# Patient Record
Sex: MALE | Race: WHITE | NOT HISPANIC OR LATINO | Employment: OTHER | URBAN - METROPOLITAN AREA
[De-identification: names, ages, dates, MRNs, and addresses within clinical notes are randomized per-mention and may not be internally consistent; named-entity substitution may affect disease eponyms.]

---

## 2017-11-29 ENCOUNTER — HOSPITAL ENCOUNTER (EMERGENCY)
Facility: HOSPITAL | Age: 62
Discharge: HOME | End: 2017-11-29
Attending: FAMILY MEDICINE | Admitting: FAMILY MEDICINE

## 2017-11-29 ENCOUNTER — APPOINTMENT (OUTPATIENT)
Dept: GENERAL RADIOLOGY | Facility: HOSPITAL | Age: 62
End: 2017-11-29

## 2017-11-29 VITALS
HEIGHT: 69 IN | BODY MASS INDEX: 27.25 KG/M2 | RESPIRATION RATE: 18 BRPM | DIASTOLIC BLOOD PRESSURE: 106 MMHG | WEIGHT: 184 LBS | TEMPERATURE: 99.9 F | SYSTOLIC BLOOD PRESSURE: 181 MMHG | OXYGEN SATURATION: 94 % | HEART RATE: 99 BPM

## 2017-11-29 DIAGNOSIS — J18.9 PNEUMONIA OF LEFT LOWER LOBE DUE TO INFECTIOUS ORGANISM: Primary | ICD-10-CM

## 2017-11-29 DIAGNOSIS — J44.0 CHRONIC OBSTRUCTIVE PULMONARY DISEASE WITH ACUTE LOWER RESPIRATORY INFECTION (HCC): ICD-10-CM

## 2017-11-29 LAB
ALBUMIN SERPL-MCNC: 3.4 G/DL (ref 3.5–5.2)
ALBUMIN/GLOB SERPL: 0.9 G/DL
ALP SERPL-CCNC: 105 U/L (ref 39–117)
ALT SERPL W P-5'-P-CCNC: 31 U/L (ref 1–41)
ANION GAP SERPL CALCULATED.3IONS-SCNC: 13.3 MMOL/L
AST SERPL-CCNC: 37 U/L (ref 1–40)
B PERT DNA SPEC QL NAA+PROBE: NOT DETECTED
BASOPHILS # BLD AUTO: 0.04 10*3/MM3 (ref 0–0.2)
BASOPHILS NFR BLD AUTO: 0.3 % (ref 0–1.5)
BILIRUB SERPL-MCNC: 0.7 MG/DL (ref 0.1–1.2)
BUN BLD-MCNC: 18 MG/DL (ref 8–23)
BUN/CREAT SERPL: 20.7 (ref 7–25)
C PNEUM DNA NPH QL NAA+NON-PROBE: NOT DETECTED
CALCIUM SPEC-SCNC: 9.8 MG/DL (ref 8.6–10.5)
CHLORIDE SERPL-SCNC: 103 MMOL/L (ref 98–107)
CO2 SERPL-SCNC: 23.7 MMOL/L (ref 22–29)
CREAT BLD-MCNC: 0.87 MG/DL (ref 0.76–1.27)
DEPRECATED RDW RBC AUTO: 48 FL (ref 37–54)
EOSINOPHIL # BLD AUTO: 0.02 10*3/MM3 (ref 0–0.7)
EOSINOPHIL NFR BLD AUTO: 0.1 % (ref 0.3–6.2)
ERYTHROCYTE [DISTWIDTH] IN BLOOD BY AUTOMATED COUNT: 12.9 % (ref 11.5–14.5)
FLUAV H1 2009 PAND RNA NPH QL NAA+PROBE: NOT DETECTED
FLUAV H1 HA GENE NPH QL NAA+PROBE: NOT DETECTED
FLUAV H3 RNA NPH QL NAA+PROBE: NOT DETECTED
FLUAV SUBTYP SPEC NAA+PROBE: NOT DETECTED
FLUBV RNA ISLT QL NAA+PROBE: NOT DETECTED
GFR SERPL CREATININE-BSD FRML MDRD: 89 ML/MIN/1.73
GLOBULIN UR ELPH-MCNC: 3.9 GM/DL
GLUCOSE BLD-MCNC: 125 MG/DL (ref 65–99)
HADV DNA SPEC NAA+PROBE: NOT DETECTED
HCOV 229E RNA SPEC QL NAA+PROBE: NOT DETECTED
HCOV HKU1 RNA SPEC QL NAA+PROBE: NOT DETECTED
HCOV NL63 RNA SPEC QL NAA+PROBE: NOT DETECTED
HCOV OC43 RNA SPEC QL NAA+PROBE: NOT DETECTED
HCT VFR BLD AUTO: 34.2 % (ref 40.4–52.2)
HGB BLD-MCNC: 12.2 G/DL (ref 13.7–17.6)
HMPV RNA NPH QL NAA+NON-PROBE: NOT DETECTED
HOLD SPECIMEN: NORMAL
HOLD SPECIMEN: NORMAL
HPIV1 RNA SPEC QL NAA+PROBE: NOT DETECTED
HPIV2 RNA SPEC QL NAA+PROBE: NOT DETECTED
HPIV3 RNA NPH QL NAA+PROBE: NOT DETECTED
HPIV4 P GENE NPH QL NAA+PROBE: NOT DETECTED
IMM GRANULOCYTES # BLD: 0.13 10*3/MM3 (ref 0–0.03)
IMM GRANULOCYTES NFR BLD: 0.9 % (ref 0–0.5)
LYMPHOCYTES # BLD AUTO: 1.07 10*3/MM3 (ref 0.9–4.8)
LYMPHOCYTES NFR BLD AUTO: 7.2 % (ref 19.6–45.3)
M PNEUMO IGG SER IA-ACNC: NOT DETECTED
MCH RBC QN AUTO: 36.4 PG (ref 27–32.7)
MCHC RBC AUTO-ENTMCNC: 35.7 G/DL (ref 32.6–36.4)
MCV RBC AUTO: 102.1 FL (ref 79.8–96.2)
MONOCYTES # BLD AUTO: 1.78 10*3/MM3 (ref 0.2–1.2)
MONOCYTES NFR BLD AUTO: 11.9 % (ref 5–12)
NEUTROPHILS # BLD AUTO: 11.89 10*3/MM3 (ref 1.9–8.1)
NEUTROPHILS NFR BLD AUTO: 79.6 % (ref 42.7–76)
NT-PROBNP SERPL-MCNC: 212 PG/ML (ref 5–900)
PLATELET # BLD AUTO: 355 10*3/MM3 (ref 140–500)
PMV BLD AUTO: 9.4 FL (ref 6–12)
POTASSIUM BLD-SCNC: 4.1 MMOL/L (ref 3.5–5.2)
PROT SERPL-MCNC: 7.3 G/DL (ref 6–8.5)
RBC # BLD AUTO: 3.35 10*6/MM3 (ref 4.6–6)
RHINOVIRUS RNA SPEC NAA+PROBE: NOT DETECTED
RSV RNA NPH QL NAA+NON-PROBE: NOT DETECTED
SODIUM BLD-SCNC: 140 MMOL/L (ref 136–145)
TROPONIN T SERPL-MCNC: <0.01 NG/ML (ref 0–0.03)
WBC NRBC COR # BLD: 14.93 10*3/MM3 (ref 4.5–10.7)
WHOLE BLOOD HOLD SPECIMEN: NORMAL
WHOLE BLOOD HOLD SPECIMEN: NORMAL

## 2017-11-29 PROCEDURE — 99284 EMERGENCY DEPT VISIT MOD MDM: CPT

## 2017-11-29 PROCEDURE — 87040 BLOOD CULTURE FOR BACTERIA: CPT | Performed by: FAMILY MEDICINE

## 2017-11-29 PROCEDURE — 84484 ASSAY OF TROPONIN QUANT: CPT | Performed by: FAMILY MEDICINE

## 2017-11-29 PROCEDURE — 87798 DETECT AGENT NOS DNA AMP: CPT | Performed by: FAMILY MEDICINE

## 2017-11-29 PROCEDURE — 71020 HC CHEST PA AND LATERAL: CPT

## 2017-11-29 PROCEDURE — 25010000002 CEFTRIAXONE PER 250 MG: Performed by: FAMILY MEDICINE

## 2017-11-29 PROCEDURE — 36415 COLL VENOUS BLD VENIPUNCTURE: CPT | Performed by: FAMILY MEDICINE

## 2017-11-29 PROCEDURE — 83880 ASSAY OF NATRIURETIC PEPTIDE: CPT | Performed by: FAMILY MEDICINE

## 2017-11-29 PROCEDURE — 93005 ELECTROCARDIOGRAM TRACING: CPT | Performed by: FAMILY MEDICINE

## 2017-11-29 PROCEDURE — 93010 ELECTROCARDIOGRAM REPORT: CPT | Performed by: INTERNAL MEDICINE

## 2017-11-29 PROCEDURE — 94799 UNLISTED PULMONARY SVC/PX: CPT

## 2017-11-29 PROCEDURE — 96365 THER/PROPH/DIAG IV INF INIT: CPT

## 2017-11-29 PROCEDURE — 94640 AIRWAY INHALATION TREATMENT: CPT

## 2017-11-29 PROCEDURE — 85025 COMPLETE CBC W/AUTO DIFF WBC: CPT | Performed by: FAMILY MEDICINE

## 2017-11-29 PROCEDURE — 87486 CHLMYD PNEUM DNA AMP PROBE: CPT | Performed by: FAMILY MEDICINE

## 2017-11-29 PROCEDURE — 87633 RESP VIRUS 12-25 TARGETS: CPT | Performed by: FAMILY MEDICINE

## 2017-11-29 PROCEDURE — 87581 M.PNEUMON DNA AMP PROBE: CPT | Performed by: FAMILY MEDICINE

## 2017-11-29 PROCEDURE — 80053 COMPREHEN METABOLIC PANEL: CPT | Performed by: FAMILY MEDICINE

## 2017-11-29 RX ORDER — SODIUM CHLORIDE 0.9 % (FLUSH) 0.9 %
10 SYRINGE (ML) INJECTION AS NEEDED
Status: DISCONTINUED | OUTPATIENT
Start: 2017-11-29 | End: 2017-11-29 | Stop reason: HOSPADM

## 2017-11-29 RX ORDER — LISINOPRIL AND HYDROCHLOROTHIAZIDE 12.5; 1 MG/1; MG/1
1 TABLET ORAL DAILY
COMMUNITY
End: 2018-01-23 | Stop reason: SDUPTHER

## 2017-11-29 RX ORDER — LEVOFLOXACIN 750 MG/1
750 TABLET ORAL DAILY
Qty: 5 TABLET | Refills: 0 | Status: SHIPPED | OUTPATIENT
Start: 2017-11-29 | End: 2017-12-28

## 2017-11-29 RX ORDER — ACETAMINOPHEN 325 MG/1
975 TABLET ORAL ONCE
Status: COMPLETED | OUTPATIENT
Start: 2017-11-29 | End: 2017-11-29

## 2017-11-29 RX ORDER — LEVOFLOXACIN 750 MG/1
750 TABLET ORAL ONCE
Status: COMPLETED | OUTPATIENT
Start: 2017-11-29 | End: 2017-11-29

## 2017-11-29 RX ORDER — CEFTRIAXONE SODIUM 1 G/50ML
1 INJECTION, SOLUTION INTRAVENOUS ONCE
Status: COMPLETED | OUTPATIENT
Start: 2017-11-29 | End: 2017-11-29

## 2017-11-29 RX ORDER — IPRATROPIUM BROMIDE AND ALBUTEROL SULFATE 2.5; .5 MG/3ML; MG/3ML
3 SOLUTION RESPIRATORY (INHALATION) ONCE
Status: COMPLETED | OUTPATIENT
Start: 2017-11-29 | End: 2017-11-29

## 2017-11-29 RX ADMIN — LEVOFLOXACIN 750 MG: 750 TABLET, FILM COATED ORAL at 17:57

## 2017-11-29 RX ADMIN — IPRATROPIUM BROMIDE AND ALBUTEROL SULFATE 3 ML: .5; 3 SOLUTION RESPIRATORY (INHALATION) at 16:20

## 2017-11-29 RX ADMIN — ACETAMINOPHEN 975 MG: 325 TABLET ORAL at 17:57

## 2017-11-29 RX ADMIN — CEFTRIAXONE SODIUM 1 G: 1 INJECTION, SOLUTION INTRAVENOUS at 16:51

## 2017-12-04 LAB
BACTERIA SPEC AEROBE CULT: NORMAL
BACTERIA SPEC AEROBE CULT: NORMAL

## 2017-12-28 ENCOUNTER — OFFICE VISIT (OUTPATIENT)
Dept: FAMILY MEDICINE CLINIC | Facility: CLINIC | Age: 62
End: 2017-12-28

## 2017-12-28 VITALS
DIASTOLIC BLOOD PRESSURE: 98 MMHG | TEMPERATURE: 99.7 F | WEIGHT: 186.2 LBS | HEIGHT: 69 IN | SYSTOLIC BLOOD PRESSURE: 164 MMHG | HEART RATE: 93 BPM | OXYGEN SATURATION: 98 % | BODY MASS INDEX: 27.58 KG/M2

## 2017-12-28 DIAGNOSIS — Z23 NEED FOR TDAP VACCINATION: ICD-10-CM

## 2017-12-28 DIAGNOSIS — J06.9 VIRAL UPPER RESPIRATORY TRACT INFECTION: ICD-10-CM

## 2017-12-28 DIAGNOSIS — Z23 NEED FOR PNEUMOCOCCAL VACCINATION: ICD-10-CM

## 2017-12-28 DIAGNOSIS — J45.41 MODERATE PERSISTENT ASTHMA WITH ACUTE EXACERBATION: ICD-10-CM

## 2017-12-28 DIAGNOSIS — R05.9 COUGH: Primary | ICD-10-CM

## 2017-12-28 DIAGNOSIS — Z23 NEED FOR INFLUENZA VACCINATION: ICD-10-CM

## 2017-12-28 PROCEDURE — G0009 ADMIN PNEUMOCOCCAL VACCINE: HCPCS | Performed by: FAMILY MEDICINE

## 2017-12-28 PROCEDURE — 90471 IMMUNIZATION ADMIN: CPT | Performed by: FAMILY MEDICINE

## 2017-12-28 PROCEDURE — 99203 OFFICE O/P NEW LOW 30 MIN: CPT | Performed by: FAMILY MEDICINE

## 2017-12-28 PROCEDURE — 90732 PPSV23 VACC 2 YRS+ SUBQ/IM: CPT | Performed by: FAMILY MEDICINE

## 2017-12-28 PROCEDURE — 90715 TDAP VACCINE 7 YRS/> IM: CPT | Performed by: FAMILY MEDICINE

## 2017-12-28 RX ORDER — MONTELUKAST SODIUM 10 MG/1
10 TABLET ORAL NIGHTLY
Qty: 30 TABLET | Refills: 3 | Status: SHIPPED | OUTPATIENT
Start: 2017-12-28

## 2017-12-28 RX ORDER — GUAIFENESIN 600 MG/1
1200 TABLET, EXTENDED RELEASE ORAL EVERY 12 HOURS SCHEDULED
Qty: 30 TABLET | Refills: 1 | Status: SHIPPED | OUTPATIENT
Start: 2017-12-28 | End: 2018-05-24 | Stop reason: HOSPADM

## 2017-12-28 RX ORDER — FLUTICASONE PROPIONATE 50 MCG
2 SPRAY, SUSPENSION (ML) NASAL DAILY
Qty: 1 BOTTLE | Refills: 6 | Status: SHIPPED | OUTPATIENT
Start: 2017-12-28 | End: 2018-01-27

## 2017-12-28 NOTE — PROGRESS NOTES
Subjective   Feliciano Llamas is a 62 y.o. male.     Chief Complaint   Patient presents with   • Hypertension     follow up    • Establish Care     new pt establishing today in office   • Cough     pt is been with dry cough and very sob since 5 days ago worse at night        HPI     Patient is a pleasant 62-year-old male here to establish care.  He has a past medical history of asthma/COPD-the patient is uncertain, however he states he has a diagnosis of asthma since childhood.  And hypertension controlled on hydrochlorothiazide.  He has a long history of mental illness in his family.  Went to the ED 11/29/2017 treated for CAP PNA - treated with levaquin, commpleted course of atibioitic with a residual dry cough, some mild SOA, feels that the combivent is helping.  He states that he is allergic to prednisone, he gets very anxious and even manic-he states he was arrested at one time after having steroids, and then, at another time required intubation?  However he takes oral inhaled steroids with no issue..  This seems to be more of an adverse effect and allergic reaction.    The following portions of the patient's history were reviewed:  [x] Allergies   [x] Current Medications  [x] Past Family History   [x] Past Medical History  [x] Past Social History   [x] Past Surgical History  [x] Problem List    Review of Systems   Constitutional: Negative.    Eyes: Negative.    Respiratory: Positive for cough, shortness of breath and wheezing.    Gastrointestinal: Negative.    Endocrine: Negative.    Genitourinary: Negative.    Musculoskeletal: Positive for arthralgias and myalgias.   Skin: Negative.    Allergic/Immunologic: Positive for environmental allergies.   Neurological: Positive for headaches.   Hematological: Negative.    Psychiatric/Behavioral: Negative.        Objective  Vitals:    12/28/17 1026   BP: 164/98   Pulse: 93   Temp: 99.7 °F (37.6 °C)   SpO2: 98%        Physical Exam   Constitutional: He is oriented to  person, place, and time. He appears well-developed and well-nourished. No distress.   HENT:   Head: Normocephalic.   Right Ear: External ear normal.   Left Ear: External ear normal.   Nose: Nose normal.   No sinus tenderness    Eyes: EOM are normal.   Cardiovascular: Normal rate, regular rhythm, normal heart sounds and intact distal pulses.    No murmur heard.  Pulmonary/Chest: Effort normal and breath sounds normal. No respiratory distress.   No wheezing or crackles.    Musculoskeletal: Normal range of motion.   Neurological: He is alert and oriented to person, place, and time.   Skin: Skin is warm and dry. No rash noted.   Psychiatric: He has a normal mood and affect. His behavior is normal. Judgment and thought content normal.   Nursing note and vitals reviewed.        Current Outpatient Prescriptions:   •  fluticasone-salmeterol (ADVAIR) 500-50 MCG/DOSE DISKUS, Inhale 1 puff 2 (Two) Times a Day., Disp: 60 each, Rfl: 0  •  ipratropium-albuterol (COMBIVENT RESPIMAT)  MCG/ACT inhaler, Inhale 1 puff 4 (Four) Times a Day As Needed for Wheezing., Disp: 1 g, Rfl: 0  •  lisinopril-hydrochlorothiazide (PRINZIDE,ZESTORETIC) 10-12.5 MG per tablet, Take 1 tablet by mouth Daily., Disp: , Rfl:   •  fluticasone (FLONASE) 50 MCG/ACT nasal spray, 2 sprays into each nostril Daily for 30 days. Administer 2 sprays in each nostril daily., Disp: 1 bottle, Rfl: 6  •  guaiFENesin (MUCINEX) 600 MG 12 hr tablet, Take 2 tablets by mouth Every 12 (Twelve) Hours., Disp: 30 tablet, Rfl: 1  •  montelukast (SINGULAIR) 10 MG tablet, Take 1 tablet by mouth Every Night., Disp: 30 tablet, Rfl: 3    Procedures    Lab Results (most recent)     None          Assessment/Plan   Feliciano was seen today for hypertension, establish care and cough.    Diagnoses and all orders for this visit:    Cough  -     Cancel: XR Chest 2 View  -     Full Pulmonary Function Test With Bronchodilator; Future    Moderate persistent asthma with acute exacerbation  -      Full Pulmonary Function Test With Bronchodilator; Future    Viral upper respiratory tract infection  -     montelukast (SINGULAIR) 10 MG tablet; Take 1 tablet by mouth Every Night.  -     fluticasone (FLONASE) 50 MCG/ACT nasal spray; 2 sprays into each nostril Daily for 30 days. Administer 2 sprays in each nostril daily.  -     guaiFENesin (MUCINEX) 600 MG 12 hr tablet; Take 2 tablets by mouth Every 12 (Twelve) Hours.    Need for Tdap vaccination  -     Tdap Vaccine Greater Than or Equal To 6yo IM    Need for pneumococcal vaccination  -     Pneumococcal Polysaccharide Vaccine 23-Valent Greater Than or Equal To 3yo Subcutaneous / IM    Need for influenza vaccination  -     Cancel: Flu Vaccine Quad PF 3YR+    Flu unavailable - go to pharmacy for flu and shingles shot.  Normal pulmonary exam, normal O2 - it sounds like cough likely from asthma/ recovering PNA.  Afebrile. Treat for URI/ congestion.  X-Ray machine not available today, FU next week for X-ray and symptoms.  Pt does not tolerate steroids, none for now.  Use CAROLINE Q4 for the next 3 days, FU to ER given.         Return in about 1 week (around 1/4/2018) for Recheck Cough/ SOA.      Aspen Gaston MD

## 2018-01-22 ENCOUNTER — TELEPHONE (OUTPATIENT)
Dept: FAMILY MEDICINE CLINIC | Facility: CLINIC | Age: 63
End: 2018-01-22

## 2018-01-22 NOTE — TELEPHONE ENCOUNTER
Patient refill request for lisinopril-hydrochlorothiazide (PRINZIDE,ZESTORETIC) 10-12.5 MG per tablet

## 2018-01-23 RX ORDER — LISINOPRIL AND HYDROCHLOROTHIAZIDE 12.5; 1 MG/1; MG/1
1 TABLET ORAL DAILY
Qty: 30 TABLET | Refills: 3 | Status: SHIPPED | OUTPATIENT
Start: 2018-01-23 | End: 2018-03-12

## 2018-02-02 ENCOUNTER — OFFICE VISIT (OUTPATIENT)
Dept: FAMILY MEDICINE CLINIC | Facility: CLINIC | Age: 63
End: 2018-02-02

## 2018-02-02 VITALS
WEIGHT: 191 LBS | SYSTOLIC BLOOD PRESSURE: 130 MMHG | DIASTOLIC BLOOD PRESSURE: 74 MMHG | BODY MASS INDEX: 28.29 KG/M2 | HEIGHT: 69 IN | HEART RATE: 76 BPM | TEMPERATURE: 98.4 F | OXYGEN SATURATION: 98 %

## 2018-02-02 DIAGNOSIS — M54.41 ACUTE BILATERAL LOW BACK PAIN WITH RIGHT-SIDED SCIATICA: Primary | ICD-10-CM

## 2018-02-02 DIAGNOSIS — F10.20 ALCOHOLIC (HCC): ICD-10-CM

## 2018-02-02 PROBLEM — I10 ESSENTIAL HYPERTENSION: Status: ACTIVE | Noted: 2018-02-02

## 2018-02-02 PROCEDURE — 99214 OFFICE O/P EST MOD 30 MIN: CPT | Performed by: FAMILY MEDICINE

## 2018-02-02 RX ORDER — PREDNISONE 20 MG/1
40 TABLET ORAL DAILY
Qty: 10 TABLET | Refills: 0 | Status: SHIPPED | OUTPATIENT
Start: 2018-02-02 | End: 2018-02-07

## 2018-02-02 NOTE — PROGRESS NOTES
Subjective   Feliciano Llamas is a 62 y.o. male.     Chief Complaint   Patient presents with   • Leg Pain     pt is been having right  leg cramps for about 10 days  had right hip pain as well sharp pains       HPI     Pt is a 62 YOM here with complaints of Right sided hip pain and lower back pain.  It has been exacerbated recently with walking long length at work at [employer].  He is doing 8 hour shifts 3 times weekly.  He also reports increasing knee pain.  He has been taking whole aspirin 2 tablets up to 3 times a day.  He is concerned as he has noticed spotting and bruising on his arms and inner thighs.  And also worsening leg cramps.  He also reports having been drinking half a pint of liquor a day.  He has been in various alcohol rehabilitation inpatient centers, one in [place] and [place] each for 4 weeks long.   He is not currently interested in rehabilitation or quitting drinking.  He is aware that this is likely going to kill him.  We discussed his elevated glucose levels and protein levels are low.  He does not want to follow them up currently.    The following portions of the patient's history were reviewed and updated as appropriate: allergies, current medications, past family history, past medical history, past social history, past surgical history and problem list.    Review of Systems   Constitutional: Negative for fever.   Gastrointestinal: Negative for blood in stool.   Genitourinary: Negative for frequency and hematuria.   Musculoskeletal: Positive for back pain, gait problem and joint swelling.        Right hip pain with right leg    Hematological: Bruises/bleeds easily.   All other systems reviewed and are negative.      Objective  Vitals:    02/02/18 1447   BP: 130/74   Pulse: 76   Temp: 98.4 °F (36.9 °C)   SpO2: 98%        Physical Exam   Constitutional: He is oriented to person, place, and time. He appears well-developed and well-nourished. No distress.   HENT:   Head: Normocephalic.   Nose:  Nose normal.   Eyes: EOM are normal.   Cardiovascular: Normal rate, regular rhythm, normal heart sounds and intact distal pulses.    No murmur heard.  Pulmonary/Chest: Effort normal and breath sounds normal. No respiratory distress.   Musculoskeletal: Normal range of motion.   Diffuse sacral back pain, and musculature on the right side of the back.   Neurological: He is alert and oriented to person, place, and time.   Skin: Skin is warm and dry. No rash noted.   Psychiatric: He has a normal mood and affect. His behavior is normal. Judgment and thought content normal.   Nursing note and vitals reviewed.        Current Outpatient Prescriptions:   •  fluticasone-salmeterol (ADVAIR) 500-50 MCG/DOSE DISKUS, Inhale 1 puff 2 (Two) Times a Day., Disp: 60 each, Rfl: 0  •  guaiFENesin (MUCINEX) 600 MG 12 hr tablet, Take 2 tablets by mouth Every 12 (Twelve) Hours., Disp: 30 tablet, Rfl: 1  •  ipratropium-albuterol (COMBIVENT RESPIMAT)  MCG/ACT inhaler, Inhale 1 puff 4 (Four) Times a Day As Needed for Wheezing., Disp: 1 g, Rfl: 0  •  lisinopril-hydrochlorothiazide (PRINZIDE,ZESTORETIC) 10-12.5 MG per tablet, Take 1 tablet by mouth Daily., Disp: 30 tablet, Rfl: 3  •  montelukast (SINGULAIR) 10 MG tablet, Take 1 tablet by mouth Every Night., Disp: 30 tablet, Rfl: 3  •  diclofenac (VOLTAREN) 50 MG EC tablet, Take 1 tablet by mouth 2 (Two) Times a Day As Needed (back pain)., Disp: 60 tablet, Rfl: 1  •  predniSONE (DELTASONE) 20 MG tablet, Take 2 tablets by mouth Daily for 5 days., Disp: 10 tablet, Rfl: 0    Procedures    Lab Results (most recent)     None          Assessment/Plan   Feliciano was seen today for leg pain.    Diagnoses and all orders for this visit:    Acute bilateral low back pain with right-sided sciatica  -     diclofenac (VOLTAREN) 50 MG EC tablet; Take 1 tablet by mouth 2 (Two) Times a Day As Needed (back pain).  -     predniSONE (DELTASONE) 20 MG tablet; Take 2 tablets by mouth Daily for 5  days.    Alcoholic      Treatment for lower back pain as above, discussed back exercises.  We discussed at length his alcoholism and that continuation with his drinking habits will cause cirrhosis.  We also discussed these likely prediabetic.  He does not want to discuss this further today.  At the next appointment he will need a hemoglobin A1c and likely repeat labs.  He is not open to therapy for alcoholism as he has done many times previously and not maintain sobriety.    Return in about 4 weeks (around 3/2/2018) for Recheck HbA1c and CMP for protein - may need work up for cirrhosis. .    15 minutes was spent of the 25 minute visit in direct counseling and coordination of care regarding:   Encounter Diagnoses   Name Primary?   • Acute bilateral low back pain with right-sided sciatica Yes   • Alcoholic          Aspen Gaston MD

## 2018-02-16 ENCOUNTER — OFFICE VISIT (OUTPATIENT)
Dept: FAMILY MEDICINE CLINIC | Facility: CLINIC | Age: 63
End: 2018-02-16

## 2018-02-16 VITALS
TEMPERATURE: 98.6 F | BODY MASS INDEX: 29.03 KG/M2 | OXYGEN SATURATION: 99 % | SYSTOLIC BLOOD PRESSURE: 160 MMHG | HEART RATE: 98 BPM | HEIGHT: 69 IN | RESPIRATION RATE: 14 BRPM | DIASTOLIC BLOOD PRESSURE: 88 MMHG | WEIGHT: 196 LBS

## 2018-02-16 DIAGNOSIS — B35.1 ONYCHOMYCOSIS: Primary | ICD-10-CM

## 2018-02-16 DIAGNOSIS — R11.0 NAUSEA: ICD-10-CM

## 2018-02-16 DIAGNOSIS — F32.1 MODERATE SINGLE CURRENT EPISODE OF MAJOR DEPRESSIVE DISORDER (HCC): ICD-10-CM

## 2018-02-16 PROCEDURE — 99214 OFFICE O/P EST MOD 30 MIN: CPT | Performed by: FAMILY MEDICINE

## 2018-02-16 RX ORDER — ONDANSETRON 4 MG/1
4 TABLET, FILM COATED ORAL EVERY 8 HOURS PRN
Qty: 30 TABLET | Refills: 0 | Status: SHIPPED | OUTPATIENT
Start: 2018-02-16

## 2018-02-16 RX ORDER — ESCITALOPRAM OXALATE 5 MG/1
5 TABLET ORAL DAILY
Qty: 30 TABLET | Refills: 1 | Status: SHIPPED | OUTPATIENT
Start: 2018-02-16 | End: 2018-03-12 | Stop reason: SDUPTHER

## 2018-02-16 NOTE — PROGRESS NOTES
Subjective   Feliciano Llamas is a 62 y.o. male.     Chief Complaint   Patient presents with   • Nausea     Patient has a nausea and lightheadedness started today.    • podiatrist     Patient needs a referral.        HPI     Patient presents the office today to discuss problems all of which are new to me.    Patient states he has a long-standing issue with the toes on both of his feet.  He states that the nails are thick and brittle.  He would like to see a podiatrist.  He's never tried any medications for the toenails.  He often feels embarrassed due to the unsightly nature of his feet.    Patient states that he has intermittent episodes of nausea.  He has no vomiting.  He is unsure as to the etiology.  He is able to tolerate food and liquids without issue.  This is been going on for the last 2 weeks or so.  No sinus congestion or drainage.    Patient states that he is going through a divorce right now.  He is suffering from feelings of hopelessness and low mood.  He's not enjoying the things that he has enjoyed previously.  He denies any suicidal thoughts or ideation.  She has not tried any counseling for these issues and has not tried any medications previously.    The following portions of the patient's history were reviewed and updated as appropriate: allergies, current medications, past family history, past medical history, past social history, past surgical history and problem list.    Review of Systems   Gastrointestinal: Positive for nausea.   All other systems reviewed and are negative.      Objective  Vitals:    02/16/18 1408   BP: 160/88   Pulse: 98   Resp: 14   Temp: 98.6 °F (37 °C)   SpO2: 99%        Physical Exam   Constitutional: He is oriented to person, place, and time. He appears well-developed and well-nourished. No distress.   HENT:   Head: Normocephalic and atraumatic.   Right Ear: External ear normal.   Left Ear: External ear normal.   Nose: Nose normal.   Mouth/Throat: Oropharynx is clear and  moist.   Eyes: Conjunctivae and EOM are normal. Pupils are equal, round, and reactive to light. Right eye exhibits no discharge. Left eye exhibits no discharge. No scleral icterus.   Cardiovascular: Normal rate, regular rhythm and normal heart sounds.    Pulmonary/Chest: Effort normal and breath sounds normal. No respiratory distress. He has no wheezes. He has no rales.   Abdominal: Soft. Bowel sounds are normal. He exhibits no distension. There is no tenderness.   Musculoskeletal:   All of the nails on both of his feet are thick and brittle.  They're discolored yellow.  No lesions or ulcerations noted.   Neurological: He is alert and oriented to person, place, and time.   Skin: Skin is warm and dry. He is not diaphoretic.   Nursing note and vitals reviewed.        Current Outpatient Prescriptions:   •  fluticasone-salmeterol (ADVAIR) 500-50 MCG/DOSE DISKUS, Inhale 1 puff 2 (Two) Times a Day., Disp: 60 each, Rfl: 0  •  guaiFENesin (MUCINEX) 600 MG 12 hr tablet, Take 2 tablets by mouth Every 12 (Twelve) Hours., Disp: 30 tablet, Rfl: 1  •  ipratropium-albuterol (COMBIVENT RESPIMAT)  MCG/ACT inhaler, Inhale 1 puff 4 (Four) Times a Day As Needed for Wheezing., Disp: 1 g, Rfl: 0  •  lisinopril-hydrochlorothiazide (PRINZIDE,ZESTORETIC) 10-12.5 MG per tablet, Take 1 tablet by mouth Daily., Disp: 30 tablet, Rfl: 3  •  montelukast (SINGULAIR) 10 MG tablet, Take 1 tablet by mouth Every Night., Disp: 30 tablet, Rfl: 3  •  escitalopram (LEXAPRO) 5 MG tablet, Take 1 tablet by mouth Daily., Disp: 30 tablet, Rfl: 1  •  ondansetron (ZOFRAN) 4 MG tablet, Take 1 tablet by mouth Every 8 (Eight) Hours As Needed for Nausea or Vomiting., Disp: 30 tablet, Rfl: 0    Procedures    Lab Results (most recent)     None          Assessment/Plan   Feliciano was seen today for nausea and podiatrist.    Diagnoses and all orders for this visit:    Onychomycosis  -     Ambulatory Referral to Podiatry    Nausea  -     ondansetron (ZOFRAN) 4 MG  tablet; Take 1 tablet by mouth Every 8 (Eight) Hours As Needed for Nausea or Vomiting.    Moderate single current episode of major depressive disorder  -     escitalopram (LEXAPRO) 5 MG tablet; Take 1 tablet by mouth Daily.    Due to the extent of the patient's onychomycosis will refer to podiatry.  A prescription was given for Zofran as above.  Discussed other symptomatic management including bland foods and keeping foods on his stomach like crackers.  Extensive conversation had with the patient regarding treatment options for depression.  Advised the patient to seek out counseling as a good option.  Will trial 30 days of Lexapro low-dose at 5 mg.  Discussed potential adverse side effects.  Advised patient follow-up in one month with Dr. Gaston.    Return in about 4 weeks (around 3/16/2018) for Recheck.      Gustabo Yu MD

## 2018-03-12 ENCOUNTER — OFFICE VISIT (OUTPATIENT)
Dept: FAMILY MEDICINE CLINIC | Facility: CLINIC | Age: 63
End: 2018-03-12

## 2018-03-12 VITALS
BODY MASS INDEX: 28.32 KG/M2 | TEMPERATURE: 98.8 F | HEART RATE: 84 BPM | HEIGHT: 69 IN | OXYGEN SATURATION: 96 % | DIASTOLIC BLOOD PRESSURE: 88 MMHG | SYSTOLIC BLOOD PRESSURE: 146 MMHG | WEIGHT: 191.2 LBS

## 2018-03-12 DIAGNOSIS — R73.9 HYPERGLYCEMIA: ICD-10-CM

## 2018-03-12 DIAGNOSIS — Z11.59 ENCOUNTER FOR HEPATITIS C SCREENING TEST FOR LOW RISK PATIENT: ICD-10-CM

## 2018-03-12 DIAGNOSIS — F33.41 RECURRENT MAJOR DEPRESSIVE DISORDER, IN PARTIAL REMISSION (HCC): ICD-10-CM

## 2018-03-12 DIAGNOSIS — F32.1 MODERATE SINGLE CURRENT EPISODE OF MAJOR DEPRESSIVE DISORDER (HCC): ICD-10-CM

## 2018-03-12 DIAGNOSIS — I10 ESSENTIAL HYPERTENSION: Primary | ICD-10-CM

## 2018-03-12 DIAGNOSIS — F10.20 ALCOHOLIC (HCC): ICD-10-CM

## 2018-03-12 DIAGNOSIS — R23.3 PETECHIAE: ICD-10-CM

## 2018-03-12 PROCEDURE — 99214 OFFICE O/P EST MOD 30 MIN: CPT | Performed by: FAMILY MEDICINE

## 2018-03-12 RX ORDER — LISINOPRIL AND HYDROCHLOROTHIAZIDE 20; 12.5 MG/1; MG/1
1 TABLET ORAL DAILY
Qty: 30 TABLET | Refills: 3 | Status: SHIPPED | OUTPATIENT
Start: 2018-03-12 | End: 2018-04-13

## 2018-03-12 RX ORDER — ESCITALOPRAM OXALATE 5 MG/1
10 TABLET ORAL DAILY
Qty: 30 TABLET | Refills: 1 | Status: SHIPPED | OUTPATIENT
Start: 2018-03-12 | End: 2018-04-13

## 2018-03-12 NOTE — PROGRESS NOTES
Feliciano Llamas is a 62 y.o. male.     Chief Complaint   Patient presents with   • Depression     follow up ,pt is handle it is afraid sex drive goes down   • Back Pain     low back pain with sciatica follow up       HPI     Pt is a 62 YOM with a PMH of THN, COPD, depression anxiety, alcoholism, and fatty liver with insomnia.  Insomnia: sleeping 4-5 hours each night.  He feels wound up for a long time. Often wakes up at 4:30 AM. Anxiety still significant, feels that he is improving but not enough.  No side effects.  Hypertension not well-controlled.  Currently on lisinopril hydrochlorothiazide 10-12.5.  He has noticed increased bruising and petechiae on his arms, he stopped taking aspirin.  He also fell walking up stairs and bruised his left hip.    The following portions of the patient's history were reviewed and updated as appropriate: allergies, current medications, past family history, past medical history, past social history, past surgical history and problem list.    Review of Systems   Musculoskeletal: Positive for arthralgias, back pain and myalgias.   Skin: Positive for color change.        Bruise in arms and left upper thigh   Hematological: Bruises/bleeds easily.   Psychiatric/Behavioral: Positive for sleep disturbance.        Depression       Objective  Vitals:    03/12/18 1318   BP: 146/88   Pulse: 84   Temp: 98.8 °F (37.1 °C)   SpO2: 96%        Physical Exam   Constitutional: He is oriented to person, place, and time. He appears well-developed and well-nourished. No distress.   HENT:   Head: Normocephalic.   Nose: Nose normal.   Eyes: EOM are normal. No scleral icterus.   Cardiovascular: Normal rate, regular rhythm, normal heart sounds and intact distal pulses.    No murmur heard.  Pulmonary/Chest: Effort normal and breath sounds normal. No respiratory distress.   Musculoskeletal: Normal range of motion.   Neurological: He is alert and oriented to person, place, and time.   Skin: Skin is warm and  dry. No rash noted.   6 cm x 4 cm bruise on the left hip, and no hematoma.  Purple discoloration.  Bilateral forearms with small 1 cm bruises.  Patient states there is no trauma, present prior to the fall.   Psychiatric: He has a normal mood and affect. His behavior is normal. Judgment and thought content normal.   Nursing note and vitals reviewed.        Current Outpatient Prescriptions:   •  escitalopram (LEXAPRO) 5 MG tablet, Take 2 tablets by mouth Daily., Disp: 30 tablet, Rfl: 1  •  fluticasone-salmeterol (ADVAIR) 500-50 MCG/DOSE DISKUS, Inhale 1 puff 2 (Two) Times a Day., Disp: 60 each, Rfl: 0  •  guaiFENesin (MUCINEX) 600 MG 12 hr tablet, Take 2 tablets by mouth Every 12 (Twelve) Hours., Disp: 30 tablet, Rfl: 1  •  ipratropium-albuterol (COMBIVENT RESPIMAT)  MCG/ACT inhaler, Inhale 1 puff 4 (Four) Times a Day As Needed for Wheezing., Disp: 1 g, Rfl: 0  •  montelukast (SINGULAIR) 10 MG tablet, Take 1 tablet by mouth Every Night., Disp: 30 tablet, Rfl: 3  •  ondansetron (ZOFRAN) 4 MG tablet, Take 1 tablet by mouth Every 8 (Eight) Hours As Needed for Nausea or Vomiting., Disp: 30 tablet, Rfl: 0  •  lisinopril-hydrochlorothiazide (PRINZIDE,ZESTORETIC) 20-12.5 MG per tablet, Take 1 tablet by mouth Daily., Disp: 30 tablet, Rfl: 3    Procedures    Lab Results (most recent)     None              Feliciano was seen today for depression and back pain.    Diagnoses and all orders for this visit:    Essential hypertension  -     Comprehensive Metabolic Panel  -     Lipid Panel  -     lisinopril-hydrochlorothiazide (PRINZIDE,ZESTORETIC) 20-12.5 MG per tablet; Take 1 tablet by mouth Daily.    Alcoholic  -     Comprehensive Metabolic Panel  -     Protime-INR  -     APTT  -     Vitamin B12 & Folate    Recurrent major depressive disorder, in partial remission    Petechiae  -     Comprehensive Metabolic Panel  -     CBC Auto Differential  -     Protime-INR  -     APTT    Hyperglycemia  -     Comprehensive Metabolic Panel  -      Hemoglobin A1c  -     Lipid Panel    Moderate single current episode of major depressive disorder  -     escitalopram (LEXAPRO) 5 MG tablet; Take 2 tablets by mouth Daily.    Encounter for hepatitis C screening test for low risk patient  -     Hepatitis C Antibody      Discussed alcoholism, down to half a pint a day.  Patient not willing to quit drinking.  2 think that the bruising could be secondary to cirrhosis.  Liver function tests as above.  Increase Lexapro to 10 mg for anxiety.  Discussed sleep hygiene for insomnia.  Tradjenta not well controlled, increase lisinopril to 20 mg.    Return in about 4 weeks (around 4/9/2018) for Recheck HTN , Medicare Wellness.    Aspen Gaston MD

## 2018-04-13 ENCOUNTER — OFFICE VISIT (OUTPATIENT)
Dept: FAMILY MEDICINE CLINIC | Facility: CLINIC | Age: 63
End: 2018-04-13

## 2018-04-13 VITALS
OXYGEN SATURATION: 98 % | DIASTOLIC BLOOD PRESSURE: 72 MMHG | WEIGHT: 189 LBS | HEART RATE: 88 BPM | TEMPERATURE: 98.2 F | RESPIRATION RATE: 14 BRPM | BODY MASS INDEX: 27.99 KG/M2 | HEIGHT: 69 IN | SYSTOLIC BLOOD PRESSURE: 150 MMHG

## 2018-04-13 DIAGNOSIS — I10 ESSENTIAL HYPERTENSION: Primary | ICD-10-CM

## 2018-04-13 DIAGNOSIS — F33.41 RECURRENT MAJOR DEPRESSIVE DISORDER, IN PARTIAL REMISSION (HCC): ICD-10-CM

## 2018-04-13 DIAGNOSIS — R68.82 DECREASED LIBIDO: ICD-10-CM

## 2018-04-13 PROCEDURE — 99214 OFFICE O/P EST MOD 30 MIN: CPT | Performed by: FAMILY MEDICINE

## 2018-04-13 RX ORDER — SERTRALINE HYDROCHLORIDE 25 MG/1
25 TABLET, FILM COATED ORAL DAILY
Qty: 60 TABLET | Refills: 2 | Status: SHIPPED | OUTPATIENT
Start: 2018-04-13 | End: 2018-04-20 | Stop reason: SDUPTHER

## 2018-04-13 NOTE — PROGRESS NOTES
Feliciano Llamas is a 62 y.o. male.     Chief Complaint   Patient presents with   • Hypertension     Patient has 4 weeks follow up on hypertension.        HPI     Pt is a 62 YOM with a PMH of HTN, COPD, depression anxiety, alcoholism (1/2 pint/ day), and fatty liver with insomnia.  He was seen at the last appointment for hypertension, major depression and alcoholism.  An extensive workup was ordered for evaluation for cirrhosis and extensive excessive alcohol intake.  However, the patient has not had laboratory work done yet.    HTN: Hypertension poorly controlled.  He has not been taking at home, occasionally when out-however he states he does not recall what the numbers were.  He has had a couple episodes of lightheadedness above, it seems to be related to standing up quickly.  Feeling light headed this AM around 11 AM, denied any diaphoresis, nausea, chest pain or pressure.  It resolved with laying down.  He has not felt short of breath, needed his inhaler.     He does affirm increased anxiety and depression with a current divorce.  He is accompanied with his girlfriend.  He states that it has been very stressful, he feels that the Lexapro has not caused much benefit.  ESRD on 10 mg daily.  He has improved with Zoloft in the past, he took it many years ago.    He is also suffering from decreased libido.  He is able to obtain an erection but has issues with pre-ejaculation and decreased desire and inability to orgasm.      The following portions of the patient's history were reviewed and updated as appropriate: allergies, current medications, past family history, past medical history, past social history, past surgical history and problem list.    Review of Systems   Constitutional: Negative for activity change.   Respiratory: Negative for cough.    Gastrointestinal: Positive for abdominal distention.   Skin: Positive for color change.   All other systems reviewed and are negative.      Objective  Vitals:     04/13/18 1258   BP: 150/72   Pulse: 88   Resp: 14   Temp: 98.2 °F (36.8 °C)   SpO2: 98%        Physical Exam   Constitutional: He is oriented to person, place, and time. He appears well-developed and well-nourished. No distress.   HENT:   Head: Normocephalic.   Nose: Nose normal.   Eyes: EOM are normal.   Cardiovascular: Normal rate, regular rhythm, normal heart sounds and intact distal pulses.    No murmur heard.  Pulmonary/Chest: Effort normal and breath sounds normal. No respiratory distress.   Musculoskeletal: Normal range of motion.   Neurological: He is alert and oriented to person, place, and time.   Skin: Skin is warm and dry. No rash noted.   Yellowish discoloration of bruising on his left lateral abdomen.  About 5 cm   Psychiatric: He has a normal mood and affect. His behavior is normal. Judgment and thought content normal.   Nursing note and vitals reviewed.        Current Outpatient Prescriptions:   •  fluticasone-salmeterol (ADVAIR) 500-50 MCG/DOSE DISKUS, Inhale 1 puff 2 (Two) Times a Day., Disp: 60 each, Rfl: 0  •  guaiFENesin (MUCINEX) 600 MG 12 hr tablet, Take 2 tablets by mouth Every 12 (Twelve) Hours., Disp: 30 tablet, Rfl: 1  •  ipratropium-albuterol (COMBIVENT RESPIMAT)  MCG/ACT inhaler, Inhale 1 puff 4 (Four) Times a Day As Needed for Wheezing., Disp: 1 g, Rfl: 0  •  montelukast (SINGULAIR) 10 MG tablet, Take 1 tablet by mouth Every Night., Disp: 30 tablet, Rfl: 3  •  ondansetron (ZOFRAN) 4 MG tablet, Take 1 tablet by mouth Every 8 (Eight) Hours As Needed for Nausea or Vomiting., Disp: 30 tablet, Rfl: 0  •  Azilsartan-Chlorthalidone 40-25 MG tablet, Take 1 tablet by mouth Daily., Disp: 30 tablet, Rfl: 2  •  sertraline (ZOLOFT) 25 MG tablet, Take 1 tablet by mouth Daily., Disp: 60 tablet, Rfl: 2    Procedures    Lab Results (most recent)     None              Feliciano was seen today for hypertension.    Diagnoses and all orders for this visit:    Essential hypertension  -      Azilsartan-Chlorthalidone 40-25 MG tablet; Take 1 tablet by mouth Daily.    Recurrent major depressive disorder, in partial remission  -     sertraline (ZOLOFT) 25 MG tablet; Take 1 tablet by mouth Daily.    Decreased libido  -     Testosterone, Free, Total    Increase hypertension therapy. D/C Lisinopril/ HCTZ 20/12.5.  Goal BP <130/80    Anxiety and depression is not improving with Lexapro.  Start Zoloft.  I do think stopping his alcohol use will dramatically improve his symptoms.  He was accompanied with his girlfriend at the appointment, there are frequent times when she would speak for him.  She has not been a prior appointments.  We'll follow up in 6 weeks    Decreased libido, and fasting AM testosterone ordered.  Patient will get remaining of fasting blood work Monday.       Return in about 6 weeks (around 5/25/2018) for Recheck HTN and Anxiety, blood work next week.      Aspen Gaston MD

## 2018-04-17 LAB
ALBUMIN SERPL-MCNC: 5.2 G/DL (ref 3.5–5.2)
ALBUMIN/GLOB SERPL: 2 G/DL
ALP SERPL-CCNC: 60 U/L (ref 39–117)
ALT SERPL-CCNC: 78 U/L (ref 1–41)
APTT PPP: 30.3 SECONDS (ref 22.7–35.4)
AST SERPL-CCNC: 137 U/L (ref 1–40)
BASOPHILS # BLD AUTO: 0.04 10*3/MM3 (ref 0–0.2)
BASOPHILS NFR BLD AUTO: 0.9 % (ref 0–1.5)
BILIRUB SERPL-MCNC: 0.6 MG/DL (ref 0.1–1.2)
BUN SERPL-MCNC: 22 MG/DL (ref 8–23)
BUN/CREAT SERPL: 22.4 (ref 7–25)
CALCIUM SERPL-MCNC: 10.5 MG/DL (ref 8.6–10.5)
CHLORIDE SERPL-SCNC: 103 MMOL/L (ref 98–107)
CHOLEST SERPL-MCNC: 224 MG/DL (ref 0–200)
CO2 SERPL-SCNC: 22.1 MMOL/L (ref 22–29)
CREAT SERPL-MCNC: 0.98 MG/DL (ref 0.76–1.27)
EOSINOPHIL # BLD AUTO: 0.16 10*3/MM3 (ref 0–0.7)
EOSINOPHIL NFR BLD AUTO: 3.7 % (ref 0.3–6.2)
ERYTHROCYTE [DISTWIDTH] IN BLOOD BY AUTOMATED COUNT: 12.7 % (ref 11.5–14.5)
FOLATE SERPL-MCNC: 3.47 NG/ML (ref 4.78–24.2)
GFR SERPLBLD CREATININE-BSD FMLA CKD-EPI: 78 ML/MIN/1.73
GFR SERPLBLD CREATININE-BSD FMLA CKD-EPI: 94 ML/MIN/1.73
GLOBULIN SER CALC-MCNC: 2.6 GM/DL
GLUCOSE SERPL-MCNC: 93 MG/DL (ref 65–99)
HBA1C MFR BLD: 5.4 % (ref 4.8–5.6)
HCT VFR BLD AUTO: 39.1 % (ref 40.4–52.2)
HDLC SERPL-MCNC: 135 MG/DL (ref 40–60)
HGB BLD-MCNC: 13.2 G/DL (ref 13.7–17.6)
IMM GRANULOCYTES # BLD: 0 10*3/MM3 (ref 0–0.03)
IMM GRANULOCYTES NFR BLD: 0 % (ref 0–0.5)
INR PPP: 1.14 (ref 0.9–1.1)
LDLC SERPL CALC-MCNC: 81 MG/DL (ref 0–100)
LYMPHOCYTES # BLD AUTO: 1.67 10*3/MM3 (ref 0.9–4.8)
LYMPHOCYTES NFR BLD AUTO: 39.1 % (ref 19.6–45.3)
MCH RBC QN AUTO: 35 PG (ref 27–32.7)
MCHC RBC AUTO-ENTMCNC: 33.8 G/DL (ref 32.6–36.4)
MCV RBC AUTO: 103.7 FL (ref 79.8–96.2)
MONOCYTES # BLD AUTO: 0.36 10*3/MM3 (ref 0.2–1.2)
MONOCYTES NFR BLD AUTO: 8.4 % (ref 5–12)
NEUTROPHILS # BLD AUTO: 2.04 10*3/MM3 (ref 1.9–8.1)
NEUTROPHILS NFR BLD AUTO: 47.9 % (ref 42.7–76)
PLATELET # BLD AUTO: 92 10*3/MM3 (ref 140–500)
POTASSIUM SERPL-SCNC: 4.2 MMOL/L (ref 3.5–5.2)
PROT SERPL-MCNC: 7.8 G/DL (ref 6–8.5)
PROTHROMBIN TIME: 14.4 SECONDS (ref 11.7–14.2)
RBC # BLD AUTO: 3.77 10*6/MM3 (ref 4.6–6)
SODIUM SERPL-SCNC: 146 MMOL/L (ref 136–145)
TRIGL SERPL-MCNC: 42 MG/DL (ref 0–150)
VIT B12 SERPL-MCNC: 511 PG/ML (ref 211–946)
VLDLC SERPL CALC-MCNC: 8.4 MG/DL (ref 5–40)
WBC # BLD AUTO: 4.27 10*3/MM3 (ref 4.5–10.7)

## 2018-04-18 DIAGNOSIS — I10 ESSENTIAL HYPERTENSION: ICD-10-CM

## 2018-04-18 LAB — HCV AB S/CO SERPL IA: <0.1 S/CO RATIO (ref 0–0.9)

## 2018-04-19 LAB
TESTOST FREE SERPL-MCNC: 6.5 PG/ML (ref 6.6–18.1)
TESTOST SERPL-MCNC: 278 NG/DL (ref 264–916)

## 2018-04-20 ENCOUNTER — OFFICE VISIT (OUTPATIENT)
Dept: FAMILY MEDICINE CLINIC | Facility: CLINIC | Age: 63
End: 2018-04-20

## 2018-04-20 VITALS
SYSTOLIC BLOOD PRESSURE: 150 MMHG | DIASTOLIC BLOOD PRESSURE: 90 MMHG | TEMPERATURE: 97.6 F | WEIGHT: 190 LBS | HEART RATE: 79 BPM | BODY MASS INDEX: 28.06 KG/M2 | OXYGEN SATURATION: 98 %

## 2018-04-20 DIAGNOSIS — K70.30 ALCOHOLIC CIRRHOSIS OF LIVER WITHOUT ASCITES (HCC): Primary | ICD-10-CM

## 2018-04-20 DIAGNOSIS — E29.1 HYPOGONADISM IN MALE: ICD-10-CM

## 2018-04-20 DIAGNOSIS — F33.41 RECURRENT MAJOR DEPRESSIVE DISORDER, IN PARTIAL REMISSION (HCC): ICD-10-CM

## 2018-04-20 PROCEDURE — 99214 OFFICE O/P EST MOD 30 MIN: CPT | Performed by: FAMILY MEDICINE

## 2018-04-20 NOTE — PROGRESS NOTES
Feliciano Llamas is a 62 y.o. male.     Chief Complaint   Patient presents with   • low testosterone levels     pt had blood work done and testosterone leves were very low        HPI     Pt is here to follow up low testosterone and discuss liver function.  He has been been drinking 1/2 pint of liquor a day.  He started drinking at 11 years old, has an alcoholic most of his life.  He states he has gone through detox 3 times, all 3 required hospitalizations.  He is currently under a lot of stress, going through a divorce and has restrictions with seeing his children.  He does have supportive his current girlfriend.  He does have some issues with premature ejaculation but he is able to obtain an erection.  Having difficulty getting an orgasm.  Otherwise he has good libido.    The following portions of the patient's history were reviewed and updated as appropriate: allergies, current medications, past family history, past medical history, past social history, past surgical history and problem list.    Review of Systems   Respiratory: Negative for shortness of breath.    Neurological: Positive for tremors. Negative for seizures.   Hematological: Bruises/bleeds easily.   All other systems reviewed and are negative.      Objective  Vitals:    04/20/18 1234   BP: 150/90   Pulse: 79   Temp: 97.6 °F (36.4 °C)   SpO2: 98%        Physical Exam   Constitutional: He is oriented to person, place, and time. He appears well-developed and well-nourished. No distress.   HENT:   Head: Normocephalic.   Nose: Nose normal.   Eyes: EOM are normal.   Cardiovascular: Normal rate, regular rhythm, normal heart sounds and intact distal pulses.    No murmur heard.  Pulmonary/Chest: Effort normal and breath sounds normal. No respiratory distress.   Musculoskeletal: Normal range of motion.   Neurological: He is alert and oriented to person, place, and time.   Skin: Skin is warm and dry. No rash noted.   5 cm oval bruise still present on the  left chest, now green and yellow discoloration.  Petechia on bilateral forearms.   Psychiatric: He has a normal mood and affect. His behavior is normal. Judgment and thought content normal.   Nursing note and vitals reviewed.        Current Outpatient Prescriptions:   •  Azilsartan-Chlorthalidone 40-25 MG tablet, Take 1 tablet by mouth Daily., Disp: 30 tablet, Rfl: 2  •  fluticasone-salmeterol (ADVAIR) 500-50 MCG/DOSE DISKUS, Inhale 1 puff 2 (Two) Times a Day., Disp: 60 each, Rfl: 0  •  guaiFENesin (MUCINEX) 600 MG 12 hr tablet, Take 2 tablets by mouth Every 12 (Twelve) Hours., Disp: 30 tablet, Rfl: 1  •  ipratropium-albuterol (COMBIVENT RESPIMAT)  MCG/ACT inhaler, Inhale 1 puff 4 (Four) Times a Day As Needed for Wheezing., Disp: 1 g, Rfl: 0  •  montelukast (SINGULAIR) 10 MG tablet, Take 1 tablet by mouth Every Night., Disp: 30 tablet, Rfl: 3  •  ondansetron (ZOFRAN) 4 MG tablet, Take 1 tablet by mouth Every 8 (Eight) Hours As Needed for Nausea or Vomiting., Disp: 30 tablet, Rfl: 0  •  sertraline (ZOLOFT) 50 MG tablet, Take 1 tablet by mouth Daily., Disp: 30 tablet, Rfl: 11    Procedures    Lab Results (most recent)     None              Feliciano was seen today for low testosterone levels.    Diagnoses and all orders for this visit:    Alcoholic cirrhosis of liver without ascites  -     Ambulatory Referral to Hepatology    Recurrent major depressive disorder, in partial remission  -     sertraline (ZOLOFT) 50 MG tablet; Take 1 tablet by mouth Daily.    Hypogonadism in male    Labs reviewed with patient, Patient with likely alcoholic cirrhosis (coags elevated, Albumin OK) - referral to GI.  Needing detox now.  He does have significant anxiety and phobias that is not improving with Zoloft 25mg, increase 50mg.  He has had some improvement with Zoloft.  His liver function is otherwise okay, will continue SSRI.  Plan to detox with alcohol.  He has made contact with someone to be his sponsor, he has worked with [facility] past for [facility].  He will seek inpatient rehabilitation.  No testosterone and total testosterone level less than 200.  Follow-up in one month for anxiety    Return in about 4 weeks (around 5/18/2018) for Recheck Anxiety, 3 months for hypogonadism .      Aspen Gaston MD

## 2018-05-24 ENCOUNTER — OFFICE VISIT (OUTPATIENT)
Dept: FAMILY MEDICINE CLINIC | Facility: CLINIC | Age: 63
End: 2018-05-24

## 2018-05-24 VITALS
HEIGHT: 69 IN | OXYGEN SATURATION: 97 % | RESPIRATION RATE: 14 BRPM | WEIGHT: 191 LBS | SYSTOLIC BLOOD PRESSURE: 144 MMHG | TEMPERATURE: 98.7 F | BODY MASS INDEX: 28.29 KG/M2 | DIASTOLIC BLOOD PRESSURE: 78 MMHG | HEART RATE: 97 BPM

## 2018-05-24 DIAGNOSIS — M25.562 LEFT KNEE PAIN, UNSPECIFIED CHRONICITY: Primary | ICD-10-CM

## 2018-05-24 DIAGNOSIS — Z96.652 HISTORY OF LEFT KNEE REPLACEMENT: ICD-10-CM

## 2018-05-24 PROCEDURE — 99213 OFFICE O/P EST LOW 20 MIN: CPT | Performed by: FAMILY MEDICINE

## 2018-05-24 RX ORDER — SERTRALINE HYDROCHLORIDE 25 MG/1
50 TABLET, FILM COATED ORAL DAILY
Refills: 2 | COMMUNITY
Start: 2018-05-18 | End: 2018-05-24

## 2018-05-24 RX ORDER — LISINOPRIL AND HYDROCHLOROTHIAZIDE 20; 12.5 MG/1; MG/1
1 TABLET ORAL EVERY MORNING
Refills: 3 | COMMUNITY
Start: 2018-05-18 | End: 2018-06-09

## 2018-05-24 RX ORDER — ALBUTEROL SULFATE 90 UG/1
108 AEROSOL, METERED RESPIRATORY (INHALATION) EVERY 4 HOURS PRN
Refills: 6 | COMMUNITY
Start: 2018-05-18

## 2018-05-24 NOTE — PROGRESS NOTES
Feliciano Llamas is a 62 y.o. male.     Chief Complaint   Patient presents with   • Knee Pain     left knee pain x6 days ago fell       HPI     Pt is a pleasant 62 y.o. YO male here for L knee pain.  This is a new problem.  He was at home approximately 5 days ago and started up quickly and lost his balance and fell to the ground.  He denies chest pain, palpitations, shortness of breath, dizziness.  He fell onto his left knee.  Initially he had some instability and pain with weightbearing.  This has improved greatly with ice, rest, ibuprofen.  However, his orthesis now pops and visibly snaps when he is walking or extending his knee.  He is unable to go to his previous orthopedic doctor.      The following portions of the patient's history were reviewed and updated as appropriate: allergies, current medications, past family history, past medical history, past social history, past surgical history and problem list.    Review of Systems   Constitutional: Negative for fatigue and fever.   Musculoskeletal: Positive for gait problem and joint swelling.        Knee pain/injury       Objective  Vitals:    05/24/18 0901   BP: 144/78   Pulse: 97   Resp: 14   Temp: 98.7 °F (37.1 °C)   SpO2: 97%        Physical Exam   Constitutional: He is oriented to person, place, and time. He appears well-developed and well-nourished. No distress.   HENT:   Head: Normocephalic.   Nose: Nose normal.   Eyes: EOM are normal.   Cardiovascular:   No murmur heard.  Pulmonary/Chest: Effort normal. No respiratory distress.   Musculoskeletal: Normal range of motion.   Left knee with surgical scars of knee replacement.  He does have visible clicking of the hardware.  However does not seem to be dislocated.  Sensory infection, no tenderness.   Neurological: He is alert and oriented to person, place, and time.   Skin: Skin is warm and dry. No rash noted.   Psychiatric: He has a normal mood and affect. His behavior is normal. Judgment and thought  content normal.   Nursing note and vitals reviewed.        Current Outpatient Prescriptions:   •  Azilsartan-Chlorthalidone 40-25 MG tablet, Take 1 tablet by mouth Daily., Disp: 30 tablet, Rfl: 2  •  fluticasone-salmeterol (ADVAIR) 500-50 MCG/DOSE DISKUS, Inhale 1 puff 2 (Two) Times a Day., Disp: 60 each, Rfl: 0  •  ipratropium-albuterol (COMBIVENT RESPIMAT)  MCG/ACT inhaler, Inhale 1 puff 4 (Four) Times a Day As Needed for Wheezing., Disp: 1 g, Rfl: 0  •  lisinopril-hydrochlorothiazide (PRINZIDE,ZESTORETIC) 20-12.5 MG per tablet, Take 1 tablet by mouth Daily., Disp: , Rfl: 3  •  montelukast (SINGULAIR) 10 MG tablet, Take 1 tablet by mouth Every Night., Disp: 30 tablet, Rfl: 3  •  ondansetron (ZOFRAN) 4 MG tablet, Take 1 tablet by mouth Every 8 (Eight) Hours As Needed for Nausea or Vomiting., Disp: 30 tablet, Rfl: 0  •  sertraline (ZOLOFT) 50 MG tablet, Take 1 tablet by mouth Daily., Disp: 30 tablet, Rfl: 11  •  VENTOLIN  (90 Base) MCG/ACT inhaler, Inhale 108 mg Every 4 (Four) Hours As Needed., Disp: , Rfl: 6    Procedures    Lab Results (most recent)     None              Feliciano was seen today for knee pain.    Diagnoses and all orders for this visit:    Left knee pain, unspecified chronicity  -     Ambulatory Referral to Orthopedic Surgery    History of left knee replacement  -     Ambulatory Referral to Orthopedic Surgery      Patient with new problem of left knee pain after a mechanical fall.  He states that he is unable to follow-up with his prior orthopedist.  His pain has significantly improved, he is not able to bear weight but does have a slight limp with walking.  No signs of infection.  However, it does seem that his hardware has moved and is now clicking and snapping with extension.  Continue with RICE.    Return in about 3 months (around 8/24/2018), or if symptoms worsen or fail to improve, for Recheck BP.      Aspen Gaston MD

## 2018-05-30 ENCOUNTER — OFFICE VISIT (OUTPATIENT)
Dept: FAMILY MEDICINE CLINIC | Facility: CLINIC | Age: 63
End: 2018-05-30

## 2018-05-30 VITALS
TEMPERATURE: 98.4 F | HEART RATE: 82 BPM | DIASTOLIC BLOOD PRESSURE: 64 MMHG | WEIGHT: 191 LBS | BODY MASS INDEX: 28.29 KG/M2 | SYSTOLIC BLOOD PRESSURE: 122 MMHG | OXYGEN SATURATION: 98 % | HEIGHT: 69 IN | RESPIRATION RATE: 18 BRPM

## 2018-05-30 DIAGNOSIS — R09.81 CONGESTION OF NASAL SINUS: ICD-10-CM

## 2018-05-30 DIAGNOSIS — F33.41 RECURRENT MAJOR DEPRESSIVE DISORDER, IN PARTIAL REMISSION (HCC): ICD-10-CM

## 2018-05-30 DIAGNOSIS — R37 SEXUAL DYSFUNCTION: ICD-10-CM

## 2018-05-30 DIAGNOSIS — F41.9 ANXIETY: Primary | ICD-10-CM

## 2018-05-30 PROCEDURE — 99214 OFFICE O/P EST MOD 30 MIN: CPT | Performed by: FAMILY MEDICINE

## 2018-05-30 RX ORDER — SERTRALINE HYDROCHLORIDE 100 MG/1
100 TABLET, FILM COATED ORAL DAILY
Qty: 90 TABLET | Refills: 3 | Status: SHIPPED | OUTPATIENT
Start: 2018-05-30

## 2018-05-30 RX ORDER — SILDENAFIL CITRATE 20 MG/1
100 TABLET ORAL DAILY PRN
Qty: 50 TABLET | Refills: 2 | Status: SHIPPED | OUTPATIENT
Start: 2018-05-30

## 2018-05-30 RX ORDER — FLUTICASONE PROPIONATE 50 MCG
2 SPRAY, SUSPENSION (ML) NASAL DAILY
Qty: 1 BOTTLE | Refills: 6 | Status: SHIPPED | OUTPATIENT
Start: 2018-05-30 | End: 2018-06-29

## 2018-05-30 NOTE — PROGRESS NOTES
Feliciano Llamas is a 62 y.o. male.     Chief Complaint   Patient presents with   • Hypertension     med check   • Cirrhosis     alcoholic   • Anxiety   • Depression   • COPD   • Follow-up       HPI     Pt is a pleasant 62 y.o. YO male here for Anxiety, HTN,  and a new problems to me of sexual dysfunction and nasal congestion.  PMH includes HTN well controlled, COPD well controlled, depression anxiety well controlled, alcoholism (1/2 pint/ day), and fatty liver with insomnia.  He was started on Zoloft 100 mg the last appointment, he has had significant improvement in his anxiety but worsening libido, able to have his usual sexual function.  He is very distressed about this.  His blood pressure has been better controlled as well.      Additionally,  he has a new complaint of worsening nasal congestion, it is worse in the left there.  He states he has had some surgery on that side previously he has not been taking any medications at this time.  No fevers.  No sinus pressure.    The following portions of the patient's history were reviewed and updated as appropriate: allergies, current medications, past family history, past medical history, past social history, past surgical history and problem list.    Review of Systems   Constitutional: Negative for fatigue and fever.   Cardiovascular: Positive for palpitations.   Musculoskeletal: Positive for joint swelling.   Psychiatric/Behavioral: The patient is nervous/anxious.        Objective  Vitals:    05/30/18 1031   BP: 122/64   Pulse: 82   Resp: 18   Temp: 98.4 °F (36.9 °C)   SpO2: 98%        Physical Exam   Constitutional: He is oriented to person, place, and time. He appears well-developed and well-nourished. No distress.   HENT:   Head: Normocephalic.   Nose: Nose normal.   Eyes: EOM are normal.   Cardiovascular: Normal rate, regular rhythm, normal heart sounds and intact distal pulses.    No murmur heard.  Pulmonary/Chest: Effort normal and breath sounds normal.  No respiratory distress.   Musculoskeletal: Normal range of motion.   Neurological: He is alert and oriented to person, place, and time.   Skin: Skin is warm and dry. No rash noted.   Psychiatric: He has a normal mood and affect. His behavior is normal. Judgment and thought content normal.   Nursing note and vitals reviewed.        Current Outpatient Prescriptions:   •  Azilsartan-Chlorthalidone 40-25 MG tablet, Take 1 tablet by mouth Daily., Disp: 30 tablet, Rfl: 2  •  fluticasone-salmeterol (ADVAIR) 500-50 MCG/DOSE DISKUS, Inhale 1 puff 2 (Two) Times a Day., Disp: 60 each, Rfl: 0  •  ipratropium-albuterol (COMBIVENT RESPIMAT)  MCG/ACT inhaler, Inhale 1 puff 4 (Four) Times a Day As Needed for Wheezing., Disp: 1 g, Rfl: 0  •  lisinopril-hydrochlorothiazide (PRINZIDE,ZESTORETIC) 20-12.5 MG per tablet, Take 1 tablet by mouth Daily., Disp: , Rfl: 3  •  montelukast (SINGULAIR) 10 MG tablet, Take 1 tablet by mouth Every Night., Disp: 30 tablet, Rfl: 3  •  ondansetron (ZOFRAN) 4 MG tablet, Take 1 tablet by mouth Every 8 (Eight) Hours As Needed for Nausea or Vomiting., Disp: 30 tablet, Rfl: 0  •  sertraline (ZOLOFT) 100 MG tablet, Take 1 tablet by mouth Daily., Disp: 90 tablet, Rfl: 3  •  VENTOLIN  (90 Base) MCG/ACT inhaler, Inhale 108 mg Every 4 (Four) Hours As Needed., Disp: , Rfl: 6  •  fluticasone (FLONASE) 50 MCG/ACT nasal spray, 2 sprays into each nostril Daily for 30 days. Administer 2 sprays in each nostril daily., Disp: 1 bottle, Rfl: 6  •  sildenafil (REVATIO) 20 MG tablet, Take 5 tablets by mouth Daily As Needed (sexual dysfunction)., Disp: 50 tablet, Rfl: 2    Procedures    Lab Results (most recent)     None              Feliciano was seen today for hypertension, cirrhosis, anxiety, depression, copd and follow-up.    Diagnoses and all orders for this visit:    Anxiety  -     sertraline (ZOLOFT) 100 MG tablet; Take 1 tablet by mouth Daily.    Sexual dysfunction  -     sildenafil (REVATIO) 20 MG tablet;  Take 5 tablets by mouth Daily As Needed (sexual dysfunction).    Congestion of nasal sinus  -     fluticasone (FLONASE) 50 MCG/ACT nasal spray; 2 sprays into each nostril Daily for 30 days. Administer 2 sprays in each nostril daily.    Recurrent major depressive disorder, in partial remission  -     sertraline (ZOLOFT) 100 MG tablet; Take 1 tablet by mouth Daily.      Into the Zoloft at current dose.  Start sildenafil for erectile dysfunction, will take 100 mg per encounter - warnings given.  Start Flonase for nasal congestion.  Just alcoholism as links.    Return in about 4 weeks (around 6/27/2018), or if symptoms worsen or fail to improve, for Recheck ETOH, Knee pain and anxiety .      Aspen Gaston MD

## 2018-06-01 ENCOUNTER — HOSPITAL ENCOUNTER (OUTPATIENT)
Dept: GENERAL RADIOLOGY | Facility: HOSPITAL | Age: 63
Discharge: HOME | End: 2018-06-01
Admitting: ORTHOPAEDIC SURGERY

## 2018-06-01 ENCOUNTER — APPOINTMENT (OUTPATIENT)
Dept: PREADMISSION TESTING | Facility: HOSPITAL | Age: 63
End: 2018-06-01

## 2018-06-01 ENCOUNTER — HOSPITAL ENCOUNTER (OUTPATIENT)
Dept: GENERAL RADIOLOGY | Facility: HOSPITAL | Age: 63
Discharge: HOME | End: 2018-06-01

## 2018-06-01 ENCOUNTER — TELEPHONE (OUTPATIENT)
Dept: FAMILY MEDICINE CLINIC | Facility: CLINIC | Age: 63
End: 2018-06-01

## 2018-06-01 VITALS
RESPIRATION RATE: 18 BRPM | HEIGHT: 69 IN | DIASTOLIC BLOOD PRESSURE: 97 MMHG | SYSTOLIC BLOOD PRESSURE: 158 MMHG | WEIGHT: 190 LBS | BODY MASS INDEX: 28.14 KG/M2 | HEART RATE: 111 BPM | OXYGEN SATURATION: 98 % | TEMPERATURE: 98.1 F

## 2018-06-01 LAB
ALBUMIN SERPL-MCNC: 4.8 G/DL (ref 3.5–5.2)
ALBUMIN/GLOB SERPL: 1.5 G/DL
ALP SERPL-CCNC: 59 U/L (ref 39–117)
ALT SERPL W P-5'-P-CCNC: 51 U/L (ref 1–41)
ANION GAP SERPL CALCULATED.3IONS-SCNC: 18.3 MMOL/L
ANION GAP SERPL CALCULATED.3IONS-SCNC: 18.5 MMOL/L
AST SERPL-CCNC: 81 U/L (ref 1–40)
BACTERIA UR QL AUTO: ABNORMAL /HPF
BASOPHILS # BLD AUTO: 0.04 10*3/MM3 (ref 0–0.2)
BASOPHILS NFR BLD AUTO: 0.8 % (ref 0–1.5)
BILIRUB SERPL-MCNC: 0.7 MG/DL (ref 0.1–1.2)
BILIRUB UR QL STRIP: NEGATIVE
BUN BLD-MCNC: 29 MG/DL (ref 8–23)
BUN BLD-MCNC: 29 MG/DL (ref 8–23)
BUN/CREAT SERPL: 29.9 (ref 7–25)
BUN/CREAT SERPL: 31.5 (ref 7–25)
CALCIUM SPEC-SCNC: 11.5 MG/DL (ref 8.6–10.5)
CALCIUM SPEC-SCNC: 11.8 MG/DL (ref 8.6–10.5)
CHLORIDE SERPL-SCNC: 99 MMOL/L (ref 98–107)
CHLORIDE SERPL-SCNC: 99 MMOL/L (ref 98–107)
CLARITY UR: ABNORMAL
CO2 SERPL-SCNC: 21.5 MMOL/L (ref 22–29)
CO2 SERPL-SCNC: 21.7 MMOL/L (ref 22–29)
COD CRY URNS QL: ABNORMAL /HPF
COLOR UR: ABNORMAL
CREAT BLD-MCNC: 0.92 MG/DL (ref 0.76–1.27)
CREAT BLD-MCNC: 0.97 MG/DL (ref 0.76–1.27)
DEPRECATED RDW RBC AUTO: 47.4 FL (ref 37–54)
EOSINOPHIL # BLD AUTO: 0.1 10*3/MM3 (ref 0–0.7)
EOSINOPHIL NFR BLD AUTO: 2 % (ref 0.3–6.2)
ERYTHROCYTE [DISTWIDTH] IN BLOOD BY AUTOMATED COUNT: 12.9 % (ref 11.5–14.5)
GFR SERPL CREATININE-BSD FRML MDRD: 79 ML/MIN/1.73
GFR SERPL CREATININE-BSD FRML MDRD: 84 ML/MIN/1.73
GLOBULIN UR ELPH-MCNC: 3.3 GM/DL
GLUCOSE BLD-MCNC: 128 MG/DL (ref 65–99)
GLUCOSE BLD-MCNC: 128 MG/DL (ref 65–99)
GLUCOSE UR STRIP-MCNC: NEGATIVE MG/DL
HCT VFR BLD AUTO: 35.7 % (ref 40.4–52.2)
HGB BLD-MCNC: 12.4 G/DL (ref 13.7–17.6)
HGB UR QL STRIP.AUTO: NEGATIVE
HYALINE CASTS UR QL AUTO: ABNORMAL /LPF
IMM GRANULOCYTES # BLD: 0.02 10*3/MM3 (ref 0–0.03)
IMM GRANULOCYTES NFR BLD: 0.4 % (ref 0–0.5)
KETONES UR QL STRIP: NEGATIVE
LEUKOCYTE ESTERASE UR QL STRIP.AUTO: ABNORMAL
LYMPHOCYTES # BLD AUTO: 0.77 10*3/MM3 (ref 0.9–4.8)
LYMPHOCYTES NFR BLD AUTO: 15.2 % (ref 19.6–45.3)
MCH RBC QN AUTO: 35 PG (ref 27–32.7)
MCHC RBC AUTO-ENTMCNC: 34.7 G/DL (ref 32.6–36.4)
MCV RBC AUTO: 100.8 FL (ref 79.8–96.2)
MONOCYTES # BLD AUTO: 0.54 10*3/MM3 (ref 0.2–1.2)
MONOCYTES NFR BLD AUTO: 10.7 % (ref 5–12)
NEUTROPHILS # BLD AUTO: 3.62 10*3/MM3 (ref 1.9–8.1)
NEUTROPHILS NFR BLD AUTO: 71.3 % (ref 42.7–76)
NITRITE UR QL STRIP: NEGATIVE
PH UR STRIP.AUTO: <=5 [PH] (ref 5–8)
PLATELET # BLD AUTO: 148 10*3/MM3 (ref 140–500)
PMV BLD AUTO: 9.5 FL (ref 6–12)
POTASSIUM BLD-SCNC: 4.5 MMOL/L (ref 3.5–5.2)
POTASSIUM BLD-SCNC: 4.5 MMOL/L (ref 3.5–5.2)
PROT SERPL-MCNC: 8.1 G/DL (ref 6–8.5)
PROT UR QL STRIP: ABNORMAL
RBC # BLD AUTO: 3.54 10*6/MM3 (ref 4.6–6)
RBC # UR: ABNORMAL /HPF
REF LAB TEST METHOD: ABNORMAL
SODIUM BLD-SCNC: 139 MMOL/L (ref 136–145)
SODIUM BLD-SCNC: 139 MMOL/L (ref 136–145)
SP GR UR STRIP: 1.02 (ref 1–1.03)
SQUAMOUS #/AREA URNS HPF: ABNORMAL /HPF
UROBILINOGEN UR QL STRIP: ABNORMAL
WBC NRBC COR # BLD: 5.07 10*3/MM3 (ref 4.5–10.7)
WBC UR QL AUTO: ABNORMAL /HPF

## 2018-06-01 PROCEDURE — 36415 COLL VENOUS BLD VENIPUNCTURE: CPT

## 2018-06-01 PROCEDURE — 85025 COMPLETE CBC W/AUTO DIFF WBC: CPT | Performed by: FAMILY MEDICINE

## 2018-06-01 PROCEDURE — 81001 URINALYSIS AUTO W/SCOPE: CPT | Performed by: ORTHOPAEDIC SURGERY

## 2018-06-01 PROCEDURE — 93005 ELECTROCARDIOGRAM TRACING: CPT

## 2018-06-01 PROCEDURE — 93010 ELECTROCARDIOGRAM REPORT: CPT | Performed by: INTERNAL MEDICINE

## 2018-06-01 PROCEDURE — 73560 X-RAY EXAM OF KNEE 1 OR 2: CPT

## 2018-06-01 PROCEDURE — 71046 X-RAY EXAM CHEST 2 VIEWS: CPT

## 2018-06-01 PROCEDURE — 80053 COMPREHEN METABOLIC PANEL: CPT | Performed by: ORTHOPAEDIC SURGERY

## 2018-06-01 RX ORDER — CHLORDIAZEPOXIDE HYDROCHLORIDE 25 MG/1
25 CAPSULE, GELATIN COATED ORAL 3 TIMES DAILY PRN
Qty: 50 CAPSULE | Refills: 0 | Status: SHIPPED | OUTPATIENT
Start: 2018-06-01 | End: 2018-06-04

## 2018-06-01 RX ORDER — CHLORHEXIDINE GLUCONATE 500 MG/1
CLOTH TOPICAL
Status: ON HOLD | COMMUNITY
End: 2018-06-04

## 2018-06-01 ASSESSMENT — KOOS JR
KOOS JR SCORE: 47.487
KOOS JR SCORE: 16

## 2018-06-01 NOTE — PAT
Discussed case with Dr Villa (anesthesia provider).  Pt here for PAT this morning for sched surg on Monday.  Documented alcoholic w/ cirrhosis and several prev attempts at rehab without success.  While here, pt very shaky, BP and HR elevated.  HIGH RISK for ETOH withdrawal after surgery.    Also PMH of COPD for which he uses inhalers.  In to ED last Fall with pneumonia and to have followed up with pulm (Osiel).  Called but no records of being seen at office.  Referred to hepatologist (Teja) and was a no show for appt there last month.    Called and discussed with Dr Shanks surg sched (Shireen) that pt needs to have control of alcohol situation prior to surgery.

## 2018-06-01 NOTE — TELEPHONE ENCOUNTER
Pt is having surgery on Monday. He is starting to have anxiety because of it, he s/w his surgeon and they suggested he call you to request an rx for the anxiety and something for his alcohol withdraw during surgery. Please advise. ([facility])

## 2018-06-01 NOTE — TELEPHONE ENCOUNTER
I called the patient and got his Voice mail - please let me know when he is here to  the script so I can discuss it with him.      I wrote a prescription for Librium to take 1 tablet every 8 hours if he is having symptoms of withdrawal.  10 day script.  If he is not having symptoms of the withdrawal do not take an extra dose.  Do not take a dose if he is feeling sleepy or sluggish.  I recommend that he stop drinking now and start the prescription tonight.  No more alcohol this weekend.  No alcohol with any of these prescriptions.    Robbie - can you please do a ANDRIA?    Thanks!

## 2018-06-01 NOTE — DISCHARGE INSTRUCTIONS
Take the following medications the morning of surgery with a small sip of water:    ZOLOFT AND LISINOPRIL    ARRIVE AT 12:00    General Instructions:  • Do not eat solid food after midnight the night before surgery.  • You may drink clear liquids day of surgery but must stop at least one hour before your hospital arrival time.  • It is beneficial for you to have a clear drink that contains carbohydrates the day of surgery.  We suggest a 12 to 20 ounce bottle of Gatorade or Powerade for non-diabetic patients or a 12 to 20 ounce bottle of G2 or Powerade Zero for diabetic patients. (Pediatric patients, are not advised to drink a 12 to 20 ounce carbohydrate drink)    Clear liquids are liquids you can see through.  Nothing red in color.     Plain water                               Sports drinks  Sodas                                   Gelatin (Jell-O)  Fruit juices without pulp such as white grape juice and apple juice  Popsicles that contain no fruit or yogurt  Tea or coffee (no cream or milk added)  Gatorade / Powerade  G2 / Powerade Zero    • Infants may have breast milk up to four hours before surgery.  • Infants drinking formula may drink formula up to six hours before surgery.   • Patients who avoid smoking, chewing tobacco and alcohol for 4 weeks prior to surgery have a reduced risk of post-operative complications.  Quit smoking as many days before surgery as you can.  • Do not smoke, use chewing tobacco or drink alcohol the day of surgery.   • If applicable bring your C-PAP/ BI-PAP machine.  • Bring any papers given to you in the doctor’s office.  • Wear clean comfortable clothes and socks.  • Do not wear contact lenses or make-up.  Bring a case for your glasses.   • Bring crutches or walker if applicable.  • Remove all piercings.  Leave jewelry and any other valuables at home.  • Hair extensions with metal clips must be removed prior to surgery.  • The Pre-Admission Testing nurse will instruct you to bring  medications if unable to obtain an accurate list in Pre-Admission Testing.        If you were given a blood bank ID arm band remember to bring it with you the day of surgery.    Preventing a Surgical Site Infection:  • For 2 to 3 days before surgery, avoid shaving with a razor because the razor can irritate skin and make it easier to develop an infection.  • The night prior to surgery sleep in a clean bed with clean clothing.  Do not allow pets to sleep with you.  • Shower on the morning of surgery using a fresh bar of anti-bacterial soap (such as Dial) and clean washcloth.  Dry with a clean towel and dress in clean clothing.  • Ask your surgeon if you will be receiving antibiotics prior to surgery.  • Make sure you, your family, and all healthcare providers clean their hands with soap and water or an alcohol based hand  before caring for you or your wound.    Day of surgery:  Upon arrival, a Pre-op nurse and Anesthesiologist will review your health history, obtain vital signs, and answer questions you may have.  The only belongings needed at this time will be your home medications and if applicable your C-PAP/BI-PAP machine.  If you are staying overnight your family can leave the rest of your belongings in the car and bring them to your room later.  A Pre-op nurse will start an IV and you may receive medication in preparation for surgery, including something to help you relax.  Your family will be able to see you in the Pre-op area.  While you are in surgery your family should notify the waiting room  if they leave the waiting room area and provide a contact phone number.    Please be aware that surgery does come with discomfort.  We want to make every effort to control your discomfort so please discuss any uncontrolled symptoms with your nurse.   Your doctor will most likely have prescribed pain medications.      If you are going home after surgery you will receive individualized written care  instructions before being discharged.  A responsible adult must drive you to and from the hospital on the day of your surgery and stay with you for 24 hours.    If you are staying overnight following surgery, you will be transported to your hospital room following the recovery period.  [facility] has all private rooms.    If you have any questions please call Pre-Admission Testing at [phone].  Deductibles and co-payments are collected on the day of service. Please be prepared to pay the required co-pay, deductible or deposit on the day of service as defined by your plan.    2% CHLORAHEXIDINE GLUCONATE* CLOTH  Preparing or “prepping” skin before surgery can reduce the risk of infection at the surgical site. To make the process easier, [facility] has chosen disposable cloths moistened with a rinse-free, 2% Chlorhexidine Gluconate (CHG) antiseptic solution. The steps below outline the prepping process and should be carefully followed.        Use the prep cloth on the area that is circled in the diagram             Directions Night before Surgery  1) Shower using a fresh bar of anti-bacterial soap (such as Dial) and clean washcloth.  Use a clean towel to completely dry your skin.  2) Do not use any lotions, oils or creams on your skin.  3) Open the package and remove 1 cloth, wipe your skin for 30 seconds in a circular motion.  Allow to dry for 3 minutes.  4) Repeat #3 with second cloth.  5) Do not touch your eyes, ears, or mouth with the prep cloth.  6) Allow the wet prep solution to air dry.  7) Discard the prep cloth and wash your hands with soap and water.   8) Dress in clean bed clothes and sleep on fresh clean bed sheets.   9) You may experience some temporary itching after the prep.    Directions Day of Surgery  1) Repeat steps 1,2,3,4,5,6,7, and 9.   2) Dress in clean clothes before coming to the hospital.    BACTROBAN NASAL OINTMENT  There are many germs normally in your nose.  Bactroban is an ointment that will help reduce these germs. Please follow these instructions for Bactroban use:      ____The day before surgery in the morning  Date________    ____The day before surgery in the evening              Date________    ____The day of surgery in the morning    Date________    **Squirt ½ package of Bactroban Ointment onto a cotton applicator and apply to inside of 1st nostril.  Squirt the remaining Bactroban and apply to the inside of the other nostril.    PERIDEX- ORAL:  Use only if your surgeon has ordered  Use the night before and morning of surgery - Swish, gargle, and spit - do not swallow.

## 2018-06-04 ENCOUNTER — TELEPHONE (OUTPATIENT)
Dept: FAMILY MEDICINE CLINIC | Facility: CLINIC | Age: 63
End: 2018-06-04

## 2018-06-04 ENCOUNTER — ANESTHESIA EVENT (OUTPATIENT)
Dept: PERIOP | Facility: HOSPITAL | Age: 63
End: 2018-06-04

## 2018-06-04 ENCOUNTER — HOSPITAL ENCOUNTER (INPATIENT)
Facility: HOSPITAL | Age: 63
LOS: 1 days | Discharge: HOME HEALTH | End: 2018-06-05
Attending: ORTHOPAEDIC SURGERY | Admitting: ORTHOPAEDIC SURGERY

## 2018-06-04 ENCOUNTER — ANESTHESIA (OUTPATIENT)
Dept: PERIOP | Facility: HOSPITAL | Age: 63
End: 2018-06-04

## 2018-06-04 ENCOUNTER — APPOINTMENT (OUTPATIENT)
Dept: GENERAL RADIOLOGY | Facility: HOSPITAL | Age: 63
End: 2018-06-04
Attending: ORTHOPAEDIC SURGERY

## 2018-06-04 DIAGNOSIS — R26.89 DECREASED MOBILITY: ICD-10-CM

## 2018-06-04 DIAGNOSIS — T84.093A FAILED TOTAL KNEE, LEFT, INITIAL ENCOUNTER (HCC): Primary | ICD-10-CM

## 2018-06-04 PROBLEM — F10.939 ALCOHOL WITHDRAWAL (HCC): Status: ACTIVE | Noted: 2018-06-04

## 2018-06-04 PROBLEM — J45.909 ASTHMA: Status: ACTIVE | Noted: 2018-06-04

## 2018-06-04 PROBLEM — J44.9 COPD (CHRONIC OBSTRUCTIVE PULMONARY DISEASE) (HCC): Status: ACTIVE | Noted: 2018-06-04

## 2018-06-04 PROCEDURE — 0SPD09Z REMOVAL OF LINER FROM LEFT KNEE JOINT, OPEN APPROACH: ICD-10-PCS | Performed by: ORTHOPAEDIC SURGERY

## 2018-06-04 PROCEDURE — 25010000002 ONDANSETRON PER 1 MG: Performed by: NURSE ANESTHETIST, CERTIFIED REGISTERED

## 2018-06-04 PROCEDURE — 25010000002 PROPOFOL 10 MG/ML EMULSION: Performed by: NURSE ANESTHETIST, CERTIFIED REGISTERED

## 2018-06-04 PROCEDURE — 73560 X-RAY EXAM OF KNEE 1 OR 2: CPT

## 2018-06-04 PROCEDURE — 25010000002 HYDRALAZINE PER 20 MG: Performed by: NURSE ANESTHETIST, CERTIFIED REGISTERED

## 2018-06-04 PROCEDURE — 0SUD09C SUPPLEMENT LEFT KNEE JOINT WITH LINER, PATELLAR SURFACE, OPEN APPROACH: ICD-10-PCS | Performed by: ORTHOPAEDIC SURGERY

## 2018-06-04 PROCEDURE — 25010000002 FENTANYL CITRATE (PF) 100 MCG/2ML SOLUTION: Performed by: ANESTHESIOLOGY

## 2018-06-04 PROCEDURE — 25010000002 FENTANYL CITRATE (PF) 100 MCG/2ML SOLUTION: Performed by: NURSE ANESTHETIST, CERTIFIED REGISTERED

## 2018-06-04 PROCEDURE — C1776 JOINT DEVICE (IMPLANTABLE): HCPCS | Performed by: ORTHOPAEDIC SURGERY

## 2018-06-04 PROCEDURE — 25010000002 HYDROMORPHONE HCL PF 500 MG/50ML SOLUTION: Performed by: ORTHOPAEDIC SURGERY

## 2018-06-04 PROCEDURE — 25010000002 MIDAZOLAM PER 1 MG: Performed by: ANESTHESIOLOGY

## 2018-06-04 PROCEDURE — 25010000002 LORAZEPAM PER 2 MG

## 2018-06-04 PROCEDURE — 25010000003 CEFAZOLIN IN DEXTROSE 2-4 GM/100ML-% SOLUTION: Performed by: ORTHOPAEDIC SURGERY

## 2018-06-04 PROCEDURE — 25010000002 HYDROMORPHONE HCL PF 500 MG/50ML SOLUTION: Performed by: NURSE ANESTHETIST, CERTIFIED REGISTERED

## 2018-06-04 PROCEDURE — 25010000002 ONDANSETRON PER 1 MG: Performed by: ANESTHESIOLOGY

## 2018-06-04 DEVICE — IMPLANTABLE DEVICE: Type: IMPLANTABLE DEVICE | Status: FUNCTIONAL

## 2018-06-04 RX ORDER — SENNA AND DOCUSATE SODIUM 50; 8.6 MG/1; MG/1
2 TABLET, FILM COATED ORAL 2 TIMES DAILY
Status: DISCONTINUED | OUTPATIENT
Start: 2018-06-04 | End: 2018-06-05 | Stop reason: HOSPADM

## 2018-06-04 RX ORDER — OXYCODONE AND ACETAMINOPHEN 10; 325 MG/1; MG/1
1 TABLET ORAL
Status: DISCONTINUED | OUTPATIENT
Start: 2018-06-04 | End: 2018-06-05 | Stop reason: HOSPADM

## 2018-06-04 RX ORDER — ALBUTEROL SULFATE 2.5 MG/3ML
2.5 SOLUTION RESPIRATORY (INHALATION) EVERY 4 HOURS PRN
Status: DISCONTINUED | OUTPATIENT
Start: 2018-06-04 | End: 2018-06-05

## 2018-06-04 RX ORDER — LIDOCAINE HYDROCHLORIDE 20 MG/ML
INJECTION, SOLUTION INFILTRATION; PERINEURAL AS NEEDED
Status: DISCONTINUED | OUTPATIENT
Start: 2018-06-04 | End: 2018-06-04 | Stop reason: SURG

## 2018-06-04 RX ORDER — BUPIVACAINE HYDROCHLORIDE AND EPINEPHRINE 2.5; 5 MG/ML; UG/ML
INJECTION, SOLUTION INFILTRATION; PERINEURAL AS NEEDED
Status: DISCONTINUED | OUTPATIENT
Start: 2018-06-04 | End: 2018-06-04 | Stop reason: HOSPADM

## 2018-06-04 RX ORDER — MIDAZOLAM HYDROCHLORIDE 1 MG/ML
1 INJECTION INTRAMUSCULAR; INTRAVENOUS
Status: DISCONTINUED | OUTPATIENT
Start: 2018-06-04 | End: 2018-06-04 | Stop reason: HOSPADM

## 2018-06-04 RX ORDER — FLUMAZENIL 0.1 MG/ML
0.2 INJECTION INTRAVENOUS AS NEEDED
Status: DISCONTINUED | OUTPATIENT
Start: 2018-06-04 | End: 2018-06-04 | Stop reason: HOSPADM

## 2018-06-04 RX ORDER — SODIUM CHLORIDE, SODIUM LACTATE, POTASSIUM CHLORIDE, CALCIUM CHLORIDE 600; 310; 30; 20 MG/100ML; MG/100ML; MG/100ML; MG/100ML
INJECTION, SOLUTION INTRAVENOUS CONTINUOUS PRN
Status: DISCONTINUED | OUTPATIENT
Start: 2018-06-04 | End: 2018-06-04 | Stop reason: SURG

## 2018-06-04 RX ORDER — PROMETHAZINE HYDROCHLORIDE 25 MG/ML
12.5 INJECTION, SOLUTION INTRAMUSCULAR; INTRAVENOUS ONCE AS NEEDED
Status: DISCONTINUED | OUTPATIENT
Start: 2018-06-04 | End: 2018-06-04 | Stop reason: HOSPADM

## 2018-06-04 RX ORDER — SERTRALINE HYDROCHLORIDE 100 MG/1
100 TABLET, FILM COATED ORAL DAILY
Status: DISCONTINUED | OUTPATIENT
Start: 2018-06-05 | End: 2018-06-05

## 2018-06-04 RX ORDER — PROMETHAZINE HYDROCHLORIDE 25 MG/1
25 SUPPOSITORY RECTAL ONCE AS NEEDED
Status: DISCONTINUED | OUTPATIENT
Start: 2018-06-04 | End: 2018-06-04 | Stop reason: HOSPADM

## 2018-06-04 RX ORDER — LABETALOL HYDROCHLORIDE 5 MG/ML
5 INJECTION, SOLUTION INTRAVENOUS
Status: DISCONTINUED | OUTPATIENT
Start: 2018-06-04 | End: 2018-06-04 | Stop reason: HOSPADM

## 2018-06-04 RX ORDER — PROMETHAZINE HYDROCHLORIDE 25 MG/1
25 TABLET ORAL ONCE AS NEEDED
Status: DISCONTINUED | OUTPATIENT
Start: 2018-06-04 | End: 2018-06-04 | Stop reason: HOSPADM

## 2018-06-04 RX ORDER — NALOXONE HCL 0.4 MG/ML
0.2 VIAL (ML) INJECTION AS NEEDED
Status: DISCONTINUED | OUTPATIENT
Start: 2018-06-04 | End: 2018-06-04 | Stop reason: HOSPADM

## 2018-06-04 RX ORDER — THIAMINE MONONITRATE (VIT B1) 100 MG
100 TABLET ORAL DAILY
Status: DISCONTINUED | OUTPATIENT
Start: 2018-06-04 | End: 2018-06-05

## 2018-06-04 RX ORDER — LORAZEPAM 1 MG/1
2 TABLET ORAL
Status: DISCONTINUED | OUTPATIENT
Start: 2018-06-04 | End: 2018-06-05 | Stop reason: HOSPADM

## 2018-06-04 RX ORDER — DIAZEPAM 5 MG/1
5 TABLET ORAL EVERY 6 HOURS PRN
Status: DISCONTINUED | OUTPATIENT
Start: 2018-06-04 | End: 2018-06-05 | Stop reason: HOSPADM

## 2018-06-04 RX ORDER — DIPHENHYDRAMINE HYDROCHLORIDE 50 MG/ML
12.5 INJECTION INTRAMUSCULAR; INTRAVENOUS
Status: DISCONTINUED | OUTPATIENT
Start: 2018-06-04 | End: 2018-06-04 | Stop reason: HOSPADM

## 2018-06-04 RX ORDER — HYDRALAZINE HYDROCHLORIDE 20 MG/ML
5 INJECTION INTRAMUSCULAR; INTRAVENOUS
Status: DISCONTINUED | OUTPATIENT
Start: 2018-06-04 | End: 2018-06-04 | Stop reason: HOSPADM

## 2018-06-04 RX ORDER — VANCOMYCIN HYDROCHLORIDE 1 G/200ML
1 INJECTION, SOLUTION INTRAVENOUS ONCE
Status: DISCONTINUED | OUTPATIENT
Start: 2018-06-04 | End: 2018-06-04 | Stop reason: HOSPADM

## 2018-06-04 RX ORDER — LORAZEPAM 2 MG/ML
INJECTION INTRAMUSCULAR
Status: COMPLETED
Start: 2018-06-04 | End: 2018-06-04

## 2018-06-04 RX ORDER — SODIUM CHLORIDE 9 MG/ML
100 INJECTION, SOLUTION INTRAVENOUS CONTINUOUS
Status: DISCONTINUED | OUTPATIENT
Start: 2018-06-04 | End: 2018-06-05

## 2018-06-04 RX ORDER — ONDANSETRON 4 MG/1
4 TABLET, FILM COATED ORAL EVERY 6 HOURS PRN
Status: DISCONTINUED | OUTPATIENT
Start: 2018-06-04 | End: 2018-06-05 | Stop reason: HOSPADM

## 2018-06-04 RX ORDER — CEFAZOLIN SODIUM 2 G/100ML
2 INJECTION, SOLUTION INTRAVENOUS EVERY 8 HOURS
Status: COMPLETED | OUTPATIENT
Start: 2018-06-05 | End: 2018-06-05

## 2018-06-04 RX ORDER — LIDOCAINE HYDROCHLORIDE 10 MG/ML
0.5 INJECTION, SOLUTION EPIDURAL; INFILTRATION; INTRACAUDAL; PERINEURAL ONCE AS NEEDED
Status: DISCONTINUED | OUTPATIENT
Start: 2018-06-04 | End: 2018-06-04 | Stop reason: HOSPADM

## 2018-06-04 RX ORDER — OXYCODONE AND ACETAMINOPHEN 7.5; 325 MG/1; MG/1
1 TABLET ORAL ONCE AS NEEDED
Status: DISCONTINUED | OUTPATIENT
Start: 2018-06-04 | End: 2018-06-04 | Stop reason: HOSPADM

## 2018-06-04 RX ORDER — FENTANYL CITRATE 50 UG/ML
50 INJECTION, SOLUTION INTRAMUSCULAR; INTRAVENOUS
Status: DISCONTINUED | OUTPATIENT
Start: 2018-06-04 | End: 2018-06-04 | Stop reason: HOSPADM

## 2018-06-04 RX ORDER — LORAZEPAM 2 MG/ML
1 INJECTION INTRAMUSCULAR
Status: DISCONTINUED | OUTPATIENT
Start: 2018-06-04 | End: 2018-06-05 | Stop reason: HOSPADM

## 2018-06-04 RX ORDER — FAMOTIDINE 10 MG/ML
20 INJECTION, SOLUTION INTRAVENOUS ONCE
Status: COMPLETED | OUTPATIENT
Start: 2018-06-04 | End: 2018-06-04

## 2018-06-04 RX ORDER — NALOXONE HCL 0.4 MG/ML
0.1 VIAL (ML) INJECTION
Status: DISCONTINUED | OUTPATIENT
Start: 2018-06-04 | End: 2018-06-05 | Stop reason: HOSPADM

## 2018-06-04 RX ORDER — FOLIC ACID 1 MG/1
1 TABLET ORAL DAILY
Status: DISCONTINUED | OUTPATIENT
Start: 2018-06-04 | End: 2018-06-05 | Stop reason: HOSPADM

## 2018-06-04 RX ORDER — SODIUM CHLORIDE 0.9 % (FLUSH) 0.9 %
1-10 SYRINGE (ML) INJECTION AS NEEDED
Status: DISCONTINUED | OUTPATIENT
Start: 2018-06-04 | End: 2018-06-05 | Stop reason: HOSPADM

## 2018-06-04 RX ORDER — MEPERIDINE HYDROCHLORIDE 25 MG/ML
12.5 INJECTION INTRAMUSCULAR; INTRAVENOUS; SUBCUTANEOUS
Status: DISCONTINUED | OUTPATIENT
Start: 2018-06-04 | End: 2018-06-04 | Stop reason: HOSPADM

## 2018-06-04 RX ORDER — HYDROMORPHONE HYDROCHLORIDE 1 MG/ML
0.5 INJECTION, SOLUTION INTRAMUSCULAR; INTRAVENOUS; SUBCUTANEOUS
Status: DISCONTINUED | OUTPATIENT
Start: 2018-06-04 | End: 2018-06-04 | Stop reason: HOSPADM

## 2018-06-04 RX ORDER — IPRATROPIUM BROMIDE AND ALBUTEROL SULFATE 2.5; .5 MG/3ML; MG/3ML
3 SOLUTION RESPIRATORY (INHALATION) EVERY 4 HOURS PRN
Status: DISCONTINUED | OUTPATIENT
Start: 2018-06-04 | End: 2018-06-05 | Stop reason: HOSPADM

## 2018-06-04 RX ORDER — EPHEDRINE SULFATE 50 MG/ML
5 INJECTION, SOLUTION INTRAVENOUS ONCE AS NEEDED
Status: DISCONTINUED | OUTPATIENT
Start: 2018-06-04 | End: 2018-06-04 | Stop reason: HOSPADM

## 2018-06-04 RX ORDER — MONTELUKAST SODIUM 10 MG/1
10 TABLET ORAL NIGHTLY
Status: DISCONTINUED | OUTPATIENT
Start: 2018-06-04 | End: 2018-06-05 | Stop reason: HOSPADM

## 2018-06-04 RX ORDER — KETOROLAC TROMETHAMINE 30 MG/ML
30 INJECTION, SOLUTION INTRAMUSCULAR; INTRAVENOUS EVERY 6 HOURS PRN
Status: DISCONTINUED | OUTPATIENT
Start: 2018-06-04 | End: 2018-06-05 | Stop reason: HOSPADM

## 2018-06-04 RX ORDER — LORAZEPAM 2 MG/ML
2 INJECTION INTRAMUSCULAR
Status: DISCONTINUED | OUTPATIENT
Start: 2018-06-04 | End: 2018-06-05 | Stop reason: HOSPADM

## 2018-06-04 RX ORDER — BISACODYL 10 MG
10 SUPPOSITORY, RECTAL RECTAL DAILY PRN
Status: DISCONTINUED | OUTPATIENT
Start: 2018-06-04 | End: 2018-06-05 | Stop reason: HOSPADM

## 2018-06-04 RX ORDER — TRANEXAMIC ACID 100 MG/ML
INJECTION, SOLUTION INTRAVENOUS AS NEEDED
Status: DISCONTINUED | OUTPATIENT
Start: 2018-06-04 | End: 2018-06-04 | Stop reason: SURG

## 2018-06-04 RX ORDER — SODIUM CHLORIDE 0.9 % (FLUSH) 0.9 %
1-10 SYRINGE (ML) INJECTION AS NEEDED
Status: DISCONTINUED | OUTPATIENT
Start: 2018-06-04 | End: 2018-06-04 | Stop reason: HOSPADM

## 2018-06-04 RX ORDER — FLUTICASONE PROPIONATE 50 MCG
2 SPRAY, SUSPENSION (ML) NASAL DAILY
Status: DISCONTINUED | OUTPATIENT
Start: 2018-06-05 | End: 2018-06-05 | Stop reason: HOSPADM

## 2018-06-04 RX ORDER — ONDANSETRON 2 MG/ML
4 INJECTION INTRAMUSCULAR; INTRAVENOUS ONCE AS NEEDED
Status: COMPLETED | OUTPATIENT
Start: 2018-06-04 | End: 2018-06-04

## 2018-06-04 RX ORDER — SODIUM CHLORIDE, SODIUM LACTATE, POTASSIUM CHLORIDE, CALCIUM CHLORIDE 600; 310; 30; 20 MG/100ML; MG/100ML; MG/100ML; MG/100ML
9 INJECTION, SOLUTION INTRAVENOUS CONTINUOUS
Status: DISCONTINUED | OUTPATIENT
Start: 2018-06-04 | End: 2018-06-04

## 2018-06-04 RX ORDER — ACETAMINOPHEN 325 MG/1
325 TABLET ORAL EVERY 4 HOURS PRN
Status: DISCONTINUED | OUTPATIENT
Start: 2018-06-04 | End: 2018-06-05 | Stop reason: HOSPADM

## 2018-06-04 RX ORDER — ONDANSETRON 4 MG/1
4 TABLET, FILM COATED ORAL EVERY 8 HOURS PRN
Status: DISCONTINUED | OUTPATIENT
Start: 2018-06-04 | End: 2018-06-05 | Stop reason: HOSPADM

## 2018-06-04 RX ORDER — THIAMINE HYDROCHLORIDE 100 MG/ML
100 INJECTION, SOLUTION INTRAMUSCULAR; INTRAVENOUS DAILY
Status: DISCONTINUED | OUTPATIENT
Start: 2018-06-04 | End: 2018-06-04

## 2018-06-04 RX ORDER — HYDROMORPHONE HYDROCHLORIDE 1 MG/ML
0.5 INJECTION, SOLUTION INTRAMUSCULAR; INTRAVENOUS; SUBCUTANEOUS
Status: DISCONTINUED | OUTPATIENT
Start: 2018-06-04 | End: 2018-06-05 | Stop reason: HOSPADM

## 2018-06-04 RX ORDER — PROPOFOL 10 MG/ML
VIAL (ML) INTRAVENOUS AS NEEDED
Status: DISCONTINUED | OUTPATIENT
Start: 2018-06-04 | End: 2018-06-04 | Stop reason: SURG

## 2018-06-04 RX ORDER — DIPHENOXYLATE HYDROCHLORIDE AND ATROPINE SULFATE 2.5; .025 MG/1; MG/1
1 TABLET ORAL DAILY
Status: DISCONTINUED | OUTPATIENT
Start: 2018-06-04 | End: 2018-06-05 | Stop reason: HOSPADM

## 2018-06-04 RX ORDER — MIDAZOLAM HYDROCHLORIDE 1 MG/ML
2 INJECTION INTRAMUSCULAR; INTRAVENOUS
Status: DISCONTINUED | OUTPATIENT
Start: 2018-06-04 | End: 2018-06-04 | Stop reason: HOSPADM

## 2018-06-04 RX ORDER — ONDANSETRON 4 MG/1
4 TABLET, ORALLY DISINTEGRATING ORAL EVERY 6 HOURS PRN
Status: DISCONTINUED | OUTPATIENT
Start: 2018-06-04 | End: 2018-06-05 | Stop reason: HOSPADM

## 2018-06-04 RX ORDER — PROMETHAZINE HYDROCHLORIDE 25 MG/1
12.5 TABLET ORAL ONCE AS NEEDED
Status: DISCONTINUED | OUTPATIENT
Start: 2018-06-04 | End: 2018-06-04 | Stop reason: HOSPADM

## 2018-06-04 RX ORDER — LORAZEPAM 1 MG/1
1 TABLET ORAL
Status: DISCONTINUED | OUTPATIENT
Start: 2018-06-04 | End: 2018-06-05 | Stop reason: HOSPADM

## 2018-06-04 RX ORDER — HYDROCODONE BITARTRATE AND ACETAMINOPHEN 7.5; 325 MG/1; MG/1
1 TABLET ORAL ONCE AS NEEDED
Status: DISCONTINUED | OUTPATIENT
Start: 2018-06-04 | End: 2018-06-04 | Stop reason: HOSPADM

## 2018-06-04 RX ORDER — ONDANSETRON 2 MG/ML
4 INJECTION INTRAMUSCULAR; INTRAVENOUS EVERY 6 HOURS PRN
Status: DISCONTINUED | OUTPATIENT
Start: 2018-06-04 | End: 2018-06-05 | Stop reason: HOSPADM

## 2018-06-04 RX ORDER — ONDANSETRON 2 MG/ML
INJECTION INTRAMUSCULAR; INTRAVENOUS AS NEEDED
Status: DISCONTINUED | OUTPATIENT
Start: 2018-06-04 | End: 2018-06-04 | Stop reason: SURG

## 2018-06-04 RX ORDER — ALBUTEROL SULFATE 90 UG/1
1 AEROSOL, METERED RESPIRATORY (INHALATION) EVERY 4 HOURS PRN
Status: DISCONTINUED | OUTPATIENT
Start: 2018-06-04 | End: 2018-06-04 | Stop reason: CLARIF

## 2018-06-04 RX ORDER — CEFAZOLIN SODIUM 2 G/100ML
2 INJECTION, SOLUTION INTRAVENOUS ONCE
Status: COMPLETED | OUTPATIENT
Start: 2018-06-04 | End: 2018-06-04

## 2018-06-04 RX ADMIN — FENTANYL CITRATE 50 MCG: 50 INJECTION INTRAMUSCULAR; INTRAVENOUS at 18:58

## 2018-06-04 RX ADMIN — HYDRALAZINE HYDROCHLORIDE 5 MG: 20 INJECTION, SOLUTION INTRAMUSCULAR; INTRAVENOUS at 19:29

## 2018-06-04 RX ADMIN — HYDROMORPHONE HYDROCHLORIDE 0.5 MG: 10 INJECTION INTRAMUSCULAR; INTRAVENOUS; SUBCUTANEOUS at 18:43

## 2018-06-04 RX ADMIN — HYDRALAZINE HYDROCHLORIDE 5 MG: 20 INJECTION, SOLUTION INTRAMUSCULAR; INTRAVENOUS at 20:00

## 2018-06-04 RX ADMIN — LIDOCAINE HYDROCHLORIDE 50 MG: 20 INJECTION, SOLUTION INFILTRATION; PERINEURAL at 16:49

## 2018-06-04 RX ADMIN — ONDANSETRON 4 MG: 2 INJECTION INTRAMUSCULAR; INTRAVENOUS at 20:00

## 2018-06-04 RX ADMIN — ONDANSETRON 4 MG: 2 INJECTION INTRAMUSCULAR; INTRAVENOUS at 17:32

## 2018-06-04 RX ADMIN — MIDAZOLAM 1 MG: 1 INJECTION INTRAMUSCULAR; INTRAVENOUS at 12:58

## 2018-06-04 RX ADMIN — FENTANYL CITRATE 50 MCG: 50 INJECTION INTRAMUSCULAR; INTRAVENOUS at 18:44

## 2018-06-04 RX ADMIN — HYDROMORPHONE HYDROCHLORIDE 0.5 MG: 10 INJECTION INTRAMUSCULAR; INTRAVENOUS; SUBCUTANEOUS at 19:29

## 2018-06-04 RX ADMIN — MIDAZOLAM 1 MG: 1 INJECTION INTRAMUSCULAR; INTRAVENOUS at 15:39

## 2018-06-04 RX ADMIN — FENTANYL CITRATE 50 MCG: 50 INJECTION INTRAMUSCULAR; INTRAVENOUS at 18:15

## 2018-06-04 RX ADMIN — PROPOFOL 300 MG: 10 INJECTION, EMULSION INTRAVENOUS at 16:49

## 2018-06-04 RX ADMIN — TRANEXAMIC ACID 1000 MG: 100 INJECTION, SOLUTION INTRAVENOUS at 16:55

## 2018-06-04 RX ADMIN — FENTANYL CITRATE 50 MCG: 50 INJECTION INTRAMUSCULAR; INTRAVENOUS at 16:49

## 2018-06-04 RX ADMIN — Medication 2 MG: at 16:45

## 2018-06-04 RX ADMIN — FENTANYL CITRATE 50 MCG: 50 INJECTION INTRAMUSCULAR; INTRAVENOUS at 17:02

## 2018-06-04 RX ADMIN — HYDROMORPHONE HYDROCHLORIDE 0.5 MG: 10 INJECTION INTRAMUSCULAR; INTRAVENOUS; SUBCUTANEOUS at 23:41

## 2018-06-04 RX ADMIN — HYDRALAZINE HYDROCHLORIDE 5 MG: 20 INJECTION, SOLUTION INTRAMUSCULAR; INTRAVENOUS at 19:14

## 2018-06-04 RX ADMIN — SODIUM CHLORIDE, POTASSIUM CHLORIDE, SODIUM LACTATE AND CALCIUM CHLORIDE 9 ML/HR: 600; 310; 30; 20 INJECTION, SOLUTION INTRAVENOUS at 18:52

## 2018-06-04 RX ADMIN — CEFAZOLIN SODIUM 2 G: 2 INJECTION, SOLUTION INTRAVENOUS at 16:45

## 2018-06-04 RX ADMIN — SODIUM CHLORIDE, POTASSIUM CHLORIDE, SODIUM LACTATE AND CALCIUM CHLORIDE 9 ML/HR: 600; 310; 30; 20 INJECTION, SOLUTION INTRAVENOUS at 12:58

## 2018-06-04 RX ADMIN — LORAZEPAM 1 MG: 2 INJECTION INTRAMUSCULAR; INTRAVENOUS at 20:07

## 2018-06-04 RX ADMIN — CEFAZOLIN SODIUM 2 G: 2 INJECTION, SOLUTION INTRAVENOUS at 23:13

## 2018-06-04 RX ADMIN — HYDROMORPHONE HYDROCHLORIDE 0.5 MG: 10 INJECTION INTRAMUSCULAR; INTRAVENOUS; SUBCUTANEOUS at 18:25

## 2018-06-04 RX ADMIN — PROPOFOL 50 MG: 10 INJECTION, EMULSION INTRAVENOUS at 17:17

## 2018-06-04 RX ADMIN — MIDAZOLAM 1 MG: 1 INJECTION INTRAMUSCULAR; INTRAVENOUS at 13:09

## 2018-06-04 RX ADMIN — FOLIC ACID 1 MG: 1 TABLET ORAL at 23:14

## 2018-06-04 RX ADMIN — FAMOTIDINE 20 MG: 10 INJECTION, SOLUTION INTRAVENOUS at 12:58

## 2018-06-04 RX ADMIN — Medication 1 TABLET: at 23:14

## 2018-06-04 RX ADMIN — SODIUM CHLORIDE, POTASSIUM CHLORIDE, SODIUM LACTATE AND CALCIUM CHLORIDE: 600; 310; 30; 20 INJECTION, SOLUTION INTRAVENOUS at 16:18

## 2018-06-04 RX ADMIN — DOCUSATE SODIUM -SENNOSIDES 2 TABLET: 50; 8.6 TABLET, COATED ORAL at 23:14

## 2018-06-04 RX ADMIN — SODIUM CHLORIDE 100 ML/HR: 9 INJECTION, SOLUTION INTRAVENOUS at 22:30

## 2018-06-04 RX ADMIN — FENTANYL CITRATE 50 MCG: 50 INJECTION INTRAMUSCULAR; INTRAVENOUS at 15:39

## 2018-06-04 RX ADMIN — MONTELUKAST 10 MG: 10 TABLET, FILM COATED ORAL at 23:14

## 2018-06-04 RX ADMIN — FENTANYL CITRATE 50 MCG: 50 INJECTION INTRAMUSCULAR; INTRAVENOUS at 17:17

## 2018-06-04 RX ADMIN — FENTANYL CITRATE 50 MCG: 50 INJECTION INTRAMUSCULAR; INTRAVENOUS at 18:26

## 2018-06-04 RX ADMIN — OXYCODONE HYDROCHLORIDE AND ACETAMINOPHEN 1 TABLET: 10; 325 TABLET ORAL at 22:30

## 2018-06-04 RX ADMIN — HYDRALAZINE HYDROCHLORIDE 5 MG: 20 INJECTION, SOLUTION INTRAMUSCULAR; INTRAVENOUS at 19:46

## 2018-06-04 NOTE — ANESTHESIA PREPROCEDURE EVALUATION
Anesthesia Evaluation     Patient summary reviewed and Nursing notes reviewed                Airway   Mallampati: II  Dental    (+) poor dentition    Pulmonary    (+) COPD, asthma, sleep apnea,   Cardiovascular     ECG reviewed  Rhythm: regular  Rate: normal    (+) hypertension,       Neuro/Psych  (+) psychiatric history Depression,     GI/Hepatic/Renal/Endo    (+)   liver disease,     Musculoskeletal (-) negative ROS    Abdominal    Substance History   (+) alcohol use,      OB/GYN negative ob/gyn ROS         Other                        Anesthesia Plan    ASA 3     general   (COPD  Alcohol abuse with likely withdrawal sobeida op  Cirrhosis  Tx with benzos, thiamine, folate)  intravenous induction   Anesthetic plan and risks discussed with patient.

## 2018-06-04 NOTE — TELEPHONE ENCOUNTER
Pt came in late Friday afternoon before we closed, stating that he decided that he wanted his prescription and wants to stop drinking. Is pt getting prescription?

## 2018-06-04 NOTE — TELEPHONE ENCOUNTER
The patient came by the office on June 1, we discussed the need to wean off of alcohol prior to surgery.  As we have discussed this previously, I had written a prescription for Librium to help him wean over the weekend in order to see if he will be stable for surgery on Monday.  However, the patient stated that he could not promise that he would not drink while taking the Librium.  Therefore we discussed it was not safe for him to take until he is ready to become sober - it may be best for him to do as an inpatient, the prescription for librium was voided and shredded.

## 2018-06-04 NOTE — H&P
ORTHOPEDIC SURGERY PRE-OP HISTORY AND PHYSICAL      Patient: Feliciano Llamas  Date of Admission: 6/4/2018 11:46 AM  YOB: 1955  Medical Record Number: [id number]  Attending Physician: Toño Shanks MD  Consulting Physician: Toño Shanks MD    CHIEF COMPLIANT: unstable left knee after fall    HISTORY OF PRESENT ILLINESS: Patient is a 62 y.o. year old male presents to [facility] with above complaints.  The patient failed conservative treatment and patient requested surgical intervention.  He is s/p hinge implant TKA that was done by myself several years ago.  He did well until he passed out (presumably from ETOH) and woke up to find his knee unstable and making metallic noises.  X-rays showed dislocated polyethylene with displaced hinge post.        ALLERGIES:   Allergies   Allergen Reactions   • Voltaren [Diclofenac Sodium] Nausea And Vomiting   • Prednisone Anxiety       HOME MEDICATIONS:  Prescriptions Prior to Admission   Medication Sig Dispense Refill Last Dose   • Chlorhexidine Gluconate Cloth 2 % pads Apply  topically. As directed pre op   6/4/2018 at 0830   • fluticasone (FLONASE) 50 MCG/ACT nasal spray 2 sprays into each nostril Daily for 30 days. Administer 2 sprays in each nostril daily. 1 bottle 6 6/4/2018 at 0800   • lisinopril-hydrochlorothiazide (PRINZIDE,ZESTORETIC) 20-12.5 MG per tablet Take 1 tablet by mouth Every Morning.  3 6/4/2018 at 0800   • mupirocin (BACTROBAN) 2 % nasal ointment into each nostril. As directed pre op   6/4/2018 at 0830   • sertraline (ZOLOFT) 100 MG tablet Take 1 tablet by mouth Daily. (Patient taking differently: Take 100 mg by mouth Every Morning.) 90 tablet 3 6/4/2018 at 0800   • ipratropium-albuterol (COMBIVENT RESPIMAT)  MCG/ACT inhaler Inhale 1 puff 4 (Four) Times a Day As Needed for Wheezing. 1 g 0 Unknown at Unknown time   • montelukast (SINGULAIR) 10 MG tablet Take 1 tablet by mouth Every Night. 30 tablet 3 6/2/2018   •  "ondansetron (ZOFRAN) 4 MG tablet Take 1 tablet by mouth Every 8 (Eight) Hours As Needed for Nausea or Vomiting. 30 tablet 0 Unknown at Unknown time   • sildenafil (REVATIO) 20 MG tablet Take 5 tablets by mouth Daily As Needed (sexual dysfunction). (Patient taking differently: Take 100 mg by mouth Daily As Needed (sexual dysfunction). TO HOLD BEFORE SURGERY) 50 tablet 2 Unknown at Unknown time   • VENTOLIN  (90 Base) MCG/ACT inhaler Inhale 108 mg Every 4 (Four) Hours As Needed.  6 Unknown at Unknown time       Past Medical History:   Diagnosis Date   • Alcohol consumption heavy    • Asthma    • At risk for sleep apnea    • COPD (chronic obstructive pulmonary disease)    • Depression    • ED (erectile dysfunction)    • Hard of hearing    • History of staph infection     LEFT KNEE, 2010   • Hypertension    • Irregular heart beat      Past Surgical History:   Procedure Laterality Date   • COLON RESECTION     • HERNIA REPAIR     • SHOULDER SURGERY Right    • TOTAL HIP ARTHROPLASTY Right    • TOTAL SHOULDER ARTHROPLASTY Right      Social History     Occupational History   • Not on file.     Social History Main Topics   • Smoking status: Never Smoker   • Smokeless tobacco: Never Used   • Alcohol use Yes      Comment: \"HALF PINT A DAY\"- liquor-saturday   • Drug use: No   • Sexual activity: Not on file      Social History     Social History Narrative   • No narrative on file     Family History   Problem Relation Age of Onset   • Lung cancer Mother         met to brain   • Hypertension Mother    • Colon cancer Father    • Heart failure Father    • Diabetes Father    • Hypertension Father    • Bipolar disorder Sister    • Suicidality Sister    • Suicidality Brother    • Anxiety disorder Sister         unknown psych   • Anxiety disorder Sister    • Bipolar disorder Sister    • Breast cancer Neg Hx    • Heart attack Neg Hx    • Stroke Neg Hx    • Malig Hyperthermia Neg Hx        REVIEW OF SYSTEMS:    HEENT: Patient denies " "any headaches, vision changes, change in hearing, or tinnitus, Patient denies epistaxis, sinus pain, hoarseness, or dysphagia   Pulmonary: Patient denies any cough, congestion, acute change in SOA or wheezing.   Cardiovascular: Patient denies any change in chest pain, dyspnea, palpitations, weakness, intolerance of exercise, varicosities, change in murmur   Gastrointestinal:  Patient denies change in appetite, melena, change in bowel habits.   Genital/Urinary: Patient denies dysuria, change in color of urine, change in frequency of urination, pain with urgency, change in incontinence, retention.   Musculoskeletal: Patient denies complaints of acute changes in symptoms of other joints not mentioned above.   Neurological: Patient denies changes in dizziness, tremor, ataxia, or difficulty in speaking or changes in memory.   Endocrine system: Patient denies acute changes in tremors, palpitations, polyuria, polydipsia, polyphagia, diaphoresis, exophthalmos, or goiter.   Psychological: Patient denies thoughts/plans or harming self or other; denies acute changes in depression,  insomnia, night terrors, darby, disorientation.   Skin: Patient denies any bruising, rashes, discoloration, pruritus,or wounds not mentioned in history of present illness or chief complaint above.   Hematopoietic: Patient denies current bleeding, epistaxis, hematuria, or melena.    PHYSICAL EXAM:   Vitals:  Vitals:    06/04/18 1226 06/04/18 1300 06/04/18 1310 06/04/18 1543   BP: (!) 197/94      BP Location: Right arm      Patient Position: Lying      Pulse: 108 88 81 95   Resp: 20 16 16    Temp: 99 °F (37.2 °C)      TempSrc: Oral      SpO2: 98% 98% 99% 98%  Comment: post sedation   Weight: 85.2 kg (187 lb 13.3 oz)      Height: 175.3 cm (69\")          General:  62 y.o. male who appears about stated age.    Alert, cooperative, in no acute distress                       Head:    Normocephalic, without obvious abnormality, atraumatic   Eyes:            " Lids and lashes normal, conjunctivae and sclerae normal, no         icterus, no pallor, corneas clear, PERRLA   Ears:    Ears appear intact with no abnormalities noted   Throat:   No oral lesions, no thrush, oral mucosa moist   Neck:   No adenopathy, supple, trachea midline, no JVD   Back:     Limited exam shows no severe kyphosis present,no visible           erythema, no excessive  tenderness to palpation.    Lungs:     Respirations regular, even and unlabored.     Heart:    Normal rate, Pulses palpable   Chest Wall:    No abnormalities observed.   Abdomen:     Normal bowel sounds, no masses, no organomegaly, soft              non-tender, non-distended, no guarding, no rebound                      tenderness   Rectal:     Deferred   Pulses:   Pulses palpable and equal bilaterally   Skin:   No bleeding, bruising or rash   Lymph nodes:   No palpable adenopathy   Extremities:     Left knee: healed incision.  Painful ROM.  Metal on metal sounds with ROM.  Very unstable.     DIAGNOSTIC TEST:  No visits with results within 2 Day(s) from this visit.   Latest known visit with results is:   Appointment on 06/01/2018   Component Date Value Ref Range Status   • Glucose 06/01/2018 128* 65 - 99 mg/dL Final   • BUN 06/01/2018 29* 8 - 23 mg/dL Final   • Creatinine 06/01/2018 0.92  0.76 - 1.27 mg/dL Final   • Sodium 06/01/2018 139  136 - 145 mmol/L Final   • Potassium 06/01/2018 4.5  3.5 - 5.2 mmol/L Final   • Chloride 06/01/2018 99  98 - 107 mmol/L Final   • CO2 06/01/2018 21.7* 22.0 - 29.0 mmol/L Final   • Calcium 06/01/2018 11.5* 8.6 - 10.5 mg/dL Final   • eGFR Non African Amer 06/01/2018 84  >60 mL/min/1.73 Final   • BUN/Creatinine Ratio 06/01/2018 31.5* 7.0 - 25.0 Final   • Anion Gap 06/01/2018 18.3  mmol/L Final   • Color, UA 06/01/2018 Dark Yellow* Yellow, Straw Final   • Appearance, UA 06/01/2018 Cloudy* Clear Final   • pH, UA 06/01/2018 <=5.0  5.0 - 8.0 Final   • Specific Gravity, UA 06/01/2018 1.022  1.005 - 1.030 Final    • Glucose, UA 06/01/2018 Negative  Negative Final   • Ketones, UA 06/01/2018 Negative  Negative Final   • Bilirubin, UA 06/01/2018 Negative  Negative Final   • Blood, UA 06/01/2018 Negative  Negative Final   • Protein, UA 06/01/2018 Trace* Negative Final   • Leuk Esterase, UA 06/01/2018 Small (1+)* Negative Final   • Nitrite, UA 06/01/2018 Negative  Negative Final   • Urobilinogen, UA 06/01/2018 1.0 E.U./dL  0.2 - 1.0 E.U./dL Final   • RBC, UA 06/01/2018 0-2  None Seen, 0-2 /HPF Final   • WBC, UA 06/01/2018 3-5* None Seen, 0-2 /HPF Final   • Bacteria, UA 06/01/2018 Trace* None Seen /HPF Final   • Squamous Epithelial Cells, UA 06/01/2018 0-2  None Seen, 0-2 /HPF Final   • Hyaline Casts, UA 06/01/2018 21-30  None Seen /LPF Final   • Calcium Oxalate Crystals, UA 06/01/2018 Small/1+  None Seen /HPF Final   • Methodology 06/01/2018 Manual Light Microscopy   Final   • Glucose 06/01/2018 128* 65 - 99 mg/dL Final   • BUN 06/01/2018 29* 8 - 23 mg/dL Final   • Creatinine 06/01/2018 0.97  0.76 - 1.27 mg/dL Final   • Sodium 06/01/2018 139  136 - 145 mmol/L Final   • Potassium 06/01/2018 4.5  3.5 - 5.2 mmol/L Final   • Chloride 06/01/2018 99  98 - 107 mmol/L Final   • CO2 06/01/2018 21.5* 22.0 - 29.0 mmol/L Final   • Calcium 06/01/2018 11.8* 8.6 - 10.5 mg/dL Final   • Total Protein 06/01/2018 8.1  6.0 - 8.5 g/dL Final   • Albumin 06/01/2018 4.80  3.50 - 5.20 g/dL Final   • ALT (SGPT) 06/01/2018 51* 1 - 41 U/L Final   • AST (SGOT) 06/01/2018 81* 1 - 40 U/L Final   • Alkaline Phosphatase 06/01/2018 59  39 - 117 U/L Final   • Total Bilirubin 06/01/2018 0.7  0.1 - 1.2 mg/dL Final   • eGFR Non African Amer 06/01/2018 79  >60 mL/min/1.73 Final   • Globulin 06/01/2018 3.3  gm/dL Final   • A/G Ratio 06/01/2018 1.5  g/dL Final   • BUN/Creatinine Ratio 06/01/2018 29.9* 7.0 - 25.0 Final   • Anion Gap 06/01/2018 18.5  mmol/L Final       No results found.      ASSESSMENT:  Failed left TKA hinge.    Patient Active Problem List   Diagnosis    • Alcoholic   • Essential hypertension   • Recurrent major depressive disorder, in partial remission   • Failed total knee, left, initial encounter       PLAN:  Left TKA revision.  Likely polyethylene and possible hinge post mechanism.    Risks and benefits of surgical intervention were discussed in detail with the patient.  Risks of infection, fracture, dislocation, extremity length discrepancy, neurovascular injury, persistent pain, medical risks, anesthetic risk, need for additional surgery, deep venous thrombosis, pulmonary embolism and death.      The above diagnosis and treatment plan was discussed with the patient and/or family.  They were educated in both non-surgical and surgical treatment options for their condition.   They were given the opportunity to ask questions and were answered to their satisfaction.  They agreed to proceed with the above treatment plan.        Toño Shanks MD  Date: 6/4/2018

## 2018-06-04 NOTE — ANESTHESIA PROCEDURE NOTES
Airway  Urgency: elective    Date/Time: 6/4/2018 4:52 PM  Airway not difficult    General Information and Staff    Patient location during procedure: OR  Anesthesiologist: DELFIN OWUSU  CRNA: KAUSHIK ARIAS    Indications and Patient Condition  Indications for airway management: airway protection    Preoxygenated: yes  MILS not maintained throughout  Mask difficulty assessment: 1 - vent by mask    Final Airway Details  Final airway type: supraglottic airway      Successful airway: unique  Size 5    Number of attempts at approach: 1    Additional Comments  Pre O2, SIAI

## 2018-06-05 ENCOUNTER — TELEPHONE (OUTPATIENT)
Dept: FAMILY MEDICINE CLINIC | Facility: CLINIC | Age: 63
End: 2018-06-05

## 2018-06-05 VITALS
WEIGHT: 189 LBS | HEIGHT: 69 IN | HEART RATE: 76 BPM | BODY MASS INDEX: 27.99 KG/M2 | DIASTOLIC BLOOD PRESSURE: 77 MMHG | SYSTOLIC BLOOD PRESSURE: 141 MMHG | RESPIRATION RATE: 16 BRPM | OXYGEN SATURATION: 95 % | TEMPERATURE: 97.4 F

## 2018-06-05 LAB
ANION GAP SERPL CALCULATED.3IONS-SCNC: 15.8 MMOL/L
BUN BLD-MCNC: 28 MG/DL (ref 8–23)
BUN/CREAT SERPL: 29.2 (ref 7–25)
CALCIUM SPEC-SCNC: 9.6 MG/DL (ref 8.6–10.5)
CHLORIDE SERPL-SCNC: 101 MMOL/L (ref 98–107)
CO2 SERPL-SCNC: 21.2 MMOL/L (ref 22–29)
CREAT BLD-MCNC: 0.96 MG/DL (ref 0.76–1.27)
GFR SERPL CREATININE-BSD FRML MDRD: 80 ML/MIN/1.73
GLUCOSE BLD-MCNC: 126 MG/DL (ref 65–99)
HCT VFR BLD AUTO: 31.3 % (ref 40.4–52.2)
HGB BLD-MCNC: 10.5 G/DL (ref 13.7–17.6)
POTASSIUM BLD-SCNC: 4 MMOL/L (ref 3.5–5.2)
SODIUM BLD-SCNC: 138 MMOL/L (ref 136–145)

## 2018-06-05 PROCEDURE — 85018 HEMOGLOBIN: CPT | Performed by: ORTHOPAEDIC SURGERY

## 2018-06-05 PROCEDURE — 97161 PT EVAL LOW COMPLEX 20 MIN: CPT

## 2018-06-05 PROCEDURE — 25010000002 LORAZEPAM PER 2 MG: Performed by: INTERNAL MEDICINE

## 2018-06-05 PROCEDURE — G8980 MOBILITY D/C STATUS: HCPCS

## 2018-06-05 PROCEDURE — 25010000003 CEFAZOLIN IN DEXTROSE 2-4 GM/100ML-% SOLUTION: Performed by: ORTHOPAEDIC SURGERY

## 2018-06-05 PROCEDURE — 80048 BASIC METABOLIC PNL TOTAL CA: CPT | Performed by: ORTHOPAEDIC SURGERY

## 2018-06-05 PROCEDURE — 85014 HEMATOCRIT: CPT | Performed by: ORTHOPAEDIC SURGERY

## 2018-06-05 PROCEDURE — 97110 THERAPEUTIC EXERCISES: CPT

## 2018-06-05 PROCEDURE — G8978 MOBILITY CURRENT STATUS: HCPCS

## 2018-06-05 PROCEDURE — 25010000002 ENOXAPARIN PER 10 MG: Performed by: ORTHOPAEDIC SURGERY

## 2018-06-05 PROCEDURE — 25010000002 HYDROMORPHONE HCL PF 500 MG/50ML SOLUTION: Performed by: ORTHOPAEDIC SURGERY

## 2018-06-05 PROCEDURE — G8979 MOBILITY GOAL STATUS: HCPCS

## 2018-06-05 RX ORDER — LANOLIN ALCOHOL/MO/W.PET/CERES
100 CREAM (GRAM) TOPICAL DAILY
Qty: 3 TABLET | Refills: 0 | Status: SHIPPED | OUTPATIENT
Start: 2018-06-05 | End: 2018-06-08

## 2018-06-05 RX ORDER — ALBUTEROL SULFATE 2.5 MG/3ML
2.5 SOLUTION RESPIRATORY (INHALATION) EVERY 4 HOURS PRN
Status: DISCONTINUED | OUTPATIENT
Start: 2018-06-05 | End: 2018-06-05 | Stop reason: HOSPADM

## 2018-06-05 RX ORDER — ASPIRIN 325 MG
325 TABLET ORAL DAILY
Qty: 42 TABLET | Refills: 0 | Status: SHIPPED | OUTPATIENT
Start: 2018-06-05 | End: 2018-07-17

## 2018-06-05 RX ORDER — OXYCODONE AND ACETAMINOPHEN 10; 325 MG/1; MG/1
1 TABLET ORAL EVERY 4 HOURS PRN
Qty: 56 TABLET | Refills: 0 | Status: SHIPPED | OUTPATIENT
Start: 2018-06-05 | End: 2018-06-14

## 2018-06-05 RX ORDER — THIAMINE MONONITRATE (VIT B1) 100 MG
100 TABLET ORAL DAILY
Status: DISCONTINUED | OUTPATIENT
Start: 2018-06-05 | End: 2018-06-05 | Stop reason: HOSPADM

## 2018-06-05 RX ORDER — OXYCODONE AND ACETAMINOPHEN 10; 325 MG/1; MG/1
1 TABLET ORAL EVERY 4 HOURS PRN
Qty: 56 TABLET | Refills: 0 | Status: SHIPPED | OUTPATIENT
Start: 2018-06-05 | End: 2018-06-05

## 2018-06-05 RX ORDER — FOLIC ACID 1 MG/1
1 TABLET ORAL DAILY
Qty: 30 TABLET | Refills: 0 | Status: SHIPPED | OUTPATIENT
Start: 2018-06-06

## 2018-06-05 RX ADMIN — CEFAZOLIN SODIUM 2 G: 2 INJECTION, SOLUTION INTRAVENOUS at 08:26

## 2018-06-05 RX ADMIN — ENOXAPARIN SODIUM 40 MG: 100 INJECTION SUBCUTANEOUS at 08:32

## 2018-06-05 RX ADMIN — LORAZEPAM 1 MG: 1 TABLET ORAL at 11:39

## 2018-06-05 RX ADMIN — LORAZEPAM 1 MG: 2 INJECTION INTRAMUSCULAR; INTRAVENOUS at 03:11

## 2018-06-05 RX ADMIN — HYDROMORPHONE HYDROCHLORIDE 0.5 MG: 10 INJECTION INTRAMUSCULAR; INTRAVENOUS; SUBCUTANEOUS at 03:11

## 2018-06-05 RX ADMIN — Medication 100 MG: at 08:27

## 2018-06-05 RX ADMIN — OXYCODONE HYDROCHLORIDE 15 MG: 15 TABLET ORAL at 01:51

## 2018-06-05 RX ADMIN — OXYCODONE HYDROCHLORIDE 20 MG: 15 TABLET ORAL at 08:54

## 2018-06-05 RX ADMIN — LISINOPRIL: 10 TABLET ORAL at 08:27

## 2018-06-05 RX ADMIN — OXYCODONE HYDROCHLORIDE AND ACETAMINOPHEN 1 TABLET: 10; 325 TABLET ORAL at 11:39

## 2018-06-05 RX ADMIN — DOCUSATE SODIUM -SENNOSIDES 2 TABLET: 50; 8.6 TABLET, COATED ORAL at 08:27

## 2018-06-05 RX ADMIN — LORAZEPAM 2 MG: 2 INJECTION INTRAMUSCULAR; INTRAVENOUS at 01:52

## 2018-06-05 NOTE — ANESTHESIA POSTPROCEDURE EVALUATION
Patient: Feliciano Llamas    Procedure Summary     Date:  06/04/18 Room / Location:  [facility] OR [address] [place] [address] MAIN OR    Anesthesia Start:  1643 Anesthesia Stop:  1803    Procedure:  LT TOTAL KNEE ARTHROPLASTY REVISION (Left Knee) Diagnosis:      Surgeon:  Toño Shanks MD Provider:  Orquidea Mar MD    Anesthesia Type:  general ASA Status:  3          Anesthesia Type: general  Last vitals  BP   145/78 (06/04/18 2010)   Temp   36.8 °C (98.3 °F) (06/04/18 1800)   Pulse   72 (06/04/18 2010)   Resp   14 (06/04/18 2010)     SpO2   96 % (06/04/18 2010)     Post Anesthesia Care and Evaluation    Patient location during evaluation: PACU  Patient participation: complete - patient participated  Level of consciousness: awake and alert  Pain management: adequate  Airway patency: patent  Anesthetic complications: No anesthetic complications  PONV Status: none  Cardiovascular status: acceptable  Respiratory status: acceptable  Hydration status: acceptable

## 2018-06-05 NOTE — PROGRESS NOTES
Discharge Planning Assessment  [facility]     Patient Name: Feliciano Llamas  MRN: [id number]  Today's Date: 6/5/2018    Admit Date: 6/4/2018          Discharge Needs Assessment     Row Name 06/05/18 0840       Living Environment    Lives With significant other    Name(s) of Who Lives With Patient Yue alfaroienelly [phone]    Current Living Arrangements home/apartment/condo    Primary Care Provided by self    Provides Primary Care For no one    Family Caregiver if Needed significant other    Family Caregiver Names Yue alfaroienelly [phone]    Quality of Family Relationships supportive    Able to Return to Prior Arrangements yes       Resource/Environmental Concerns    Resource/Environmental Concerns none    Transportation Concerns car, none       Transition Planning    Patient/Family Anticipates Transition to home with family    Patient/Family Anticipated Services at Transition none    Transportation Anticipated family or friend will provide       Discharge Needs Assessment    Readmission Within the Last 30 Days no previous admission in last 30 days    Concerns to be Addressed discharge planning    Equipment Currently Used at Home walker, standard;cane, straight    Anticipated Changes Related to Illness none    Equipment Needed After Discharge walker, standard;cane, straight    Outpatient/Agency/Support Group Needs homecare agency            Discharge Plan     Row Name 06/05/18 0848       Plan    Plan Home with [facility]    Plan Comments Patient states he uses the [facility] on [facility] denies issues obtaining or affording. Patient does not have Living Will, but states IMM 6/4/2018.  Met with patient and Yue alfaroiend [phone] at bedside, knows his wishes.     Row Name 06/05/18 0842       Plan    Plan Home with [facility]    Patient/Family in Agreement with Plan yes    Plan Comments IMM 6/4/2018.  Met with patient and Yue alfaroiend [phone]  at bedside, patient granted me permission to speak to him while girlfriend remained in the room.  Face sheet reviewed, patient marital status is , face sheet updated.  Prior to admission patient was independent with all aspects of his ADL's.  Currently patient lives with his girlfriend on a second floor apartment, he states he can negotiate the steps.  At home, patient has both a cane and walker.  Discharged plans discussed, he will need home health for physical therapy, and patient requested [facility]. Eileen with [facility] notified, she will review and follow.  Anticipate discharge today, girlfriend will be able to transport home. Will continue to monitor if additional discharge needs identified.         Destination     No service coordination in this encounter.      Durable Medical Equipment     No service coordination in this encounter.      Dialysis/Infusion     No service coordination in this encounter.      Home Medical Care - Selection Complete     Service Request Status Selected Specialties Address Phone Number Fax Number    [facility] [place] Selected Home Health Services [address] [address] [phone]      Social Care     No service coordination in this encounter.        Expected Discharge Date and Time     Expected Discharge Date Expected Discharge Time    Jun 5, 2018               Demographic Summary     Row Name 06/05/18 0839       General Information    Admission Type inpatient    Arrived From home    Required Notices Provided Important Message from Medicare    Referral Source admission list    Reason for Consult discharge planning    Preferred Language English     Used During This Interaction no       Contact Information    Permission Granted to Share Info With family/designee   Yue rooneyfrienelly [phone]            Functional Status     Row Name 06/05/18 0840       Functional Status     Usual Activity Tolerance good    Current Activity Tolerance moderate       Functional Status, IADL    Medications independent    Meal Preparation independent       Mental Status    General Appearance WDL WDL       Mental Status Summary    Recent Changes in Mental Status/Cognitive Functioning no changes       Employment/    Employment Status retired    Current or Previous Occupation factory work            Psychosocial    No documentation.           Abuse/Neglect    No documentation.           Legal    No documentation.           Substance Abuse    No documentation.           Patient Forms    No documentation.         Adriana Faye RN

## 2018-06-05 NOTE — PROGRESS NOTES
Discharge Planning Assessment  [facility]     Patient Name: Feliciano Llamas  MRN: [id number]  Today's Date: 6/5/2018    Admit Date: 6/4/2018          Discharge Needs Assessment     Row Name 06/05/18 0840       Living Environment    Lives With significant other    Name(s) of Who Lives With Patient Yue alfaroienelly [phone]    Current Living Arrangements home/apartment/condo    Primary Care Provided by self    Provides Primary Care For no one    Family Caregiver if Needed significant other    Family Caregiver Names Yue baez [phone]    Quality of Family Relationships supportive    Able to Return to Prior Arrangements yes       Resource/Environmental Concerns    Resource/Environmental Concerns none    Transportation Concerns car, none       Transition Planning    Patient/Family Anticipates Transition to home with family    Patient/Family Anticipated Services at Transition none    Transportation Anticipated family or friend will provide       Discharge Needs Assessment    Readmission Within the Last 30 Days no previous admission in last 30 days    Concerns to be Addressed discharge planning    Equipment Currently Used at Home walker, standard;cane, straight    Anticipated Changes Related to Illness none    Equipment Needed After Discharge walker, standard;cane, straight    Outpatient/Agency/Support Group Needs homecare agency            Discharge Plan     Row Name 06/05/18 0842       Plan    Plan Home with [facility]    Patient/Family in Agreement with Plan yes    Plan Comments IMM 6/4/2018.  Met with patient and Yue alfaroiend [phone] at bedside, patient granted me permission to speak to him while girlfriend remained in the room.  Face sheet reviewed, patient marital status is , face sheet updated.  Prior to admission patient was independent with all aspects of his ADL's.  Currently patient lives with his girlfriend on a second floor apartment, he states  he can negotiate the steps.  At home, patient has both a cane and walker.  Discharged plans discussed, he will need home health for physical therapy, and patient requested [facility]. Eileen with [facility] notified, she will review and follow.  Anticipate discharge today, girlfriend will be able to transport home. Will continue to monitor if additional discharge needs identified.         Destination     No service coordination in this encounter.      Durable Medical Equipment     No service coordination in this encounter.      Dialysis/Infusion     No service coordination in this encounter.      Home Medical Care - Selection Complete     Service Request Status Selected Specialties Address Phone Number Fax Number    [facility] Selected Home Health Services [address] [address] [phone]      Social Care     No service coordination in this encounter.        Expected Discharge Date and Time     Expected Discharge Date Expected Discharge Time    Jun 5, 2018               Demographic Summary     Row Name 06/05/18 0839       General Information    Admission Type inpatient    Arrived From home    Required Notices Provided Important Message from Medicare    Referral Source admission list    Reason for Consult discharge planning    Preferred Language English     Used During This Interaction no       Contact Information    Permission Granted to Share Info With family/designee   Yue Cerda girlfriend [phone]            Functional Status     Row Name 06/05/18 0840       Functional Status    Usual Activity Tolerance good    Current Activity Tolerance moderate       Functional Status, IADL    Medications independent    Meal Preparation independent       Mental Status    General Appearance WDL WDL       Mental Status Summary    Recent Changes in Mental Status/Cognitive Functioning no changes       Employment/     Employment Status retired    Current or Previous Occupation factory work            Psychosocial    No documentation.           Abuse/Neglect    No documentation.           Legal    No documentation.           Substance Abuse    No documentation.           Patient Forms    No documentation.         Adriana Faye RN

## 2018-06-05 NOTE — DISCHARGE SUMMARY
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 male admitted after undergoing the above surgical procedure. They were admitted for post-operative pain control, medical management and physical therapy.   He has history of heavy alcohol abuse.  He was having signs of withdrawal, and [facility] medical service was consulted for medical management. They progressed with physical therapy.    They were deemed stable for discharge.      Consults:   Consults     Date and Time Order Name Status Description    6/4/2018 1907 Inpatient Internal Medicine Consult Completed           Discharge Instructions:  Patient is weight bearing as tolerated on the operative leg.  Patient is to progress range of motion and ambulation as tolerated.  Use walker as needed for stability and gait.  May progress to cane as tolerated.  The dressing should be changed daily and as needed for saturation.  Keep incision dry until staple removal.  Use ace wrap as needed for swelling.  Patient will follow-up in the office in 2 weeks.  Call the office at [phone] for any questions or concerns.      Discharge Medications   Feliciano Llamas   Home Medication Instructions CELIA:[id number]    Printed on:06/05/18 0800   Medication Information                      aspirin (SHIRA ASPIRIN) 325 MG tablet  Take 1 tablet by  mouth Daily for 42 days.             fluticasone (FLONASE) 50 MCG/ACT nasal spray  2 sprays into each nostril Daily for 30 days. Administer 2 sprays in each nostril daily.             folic acid (FOLVITE) 1 MG tablet  Take 1 tablet by mouth Daily.             ipratropium-albuterol (COMBIVENT RESPIMAT)  MCG/ACT inhaler  Inhale 1 puff 4 (Four) Times a Day As Needed for Wheezing.             lisinopril-hydrochlorothiazide (PRINZIDE,ZESTORETIC) 20-12.5 MG per tablet  Take 1 tablet by mouth Every Morning.             montelukast (SINGULAIR) 10 MG tablet  Take 1 tablet by mouth Every Night.             ondansetron (ZOFRAN) 4 MG tablet  Take 1 tablet by mouth Every 8 (Eight) Hours As Needed for Nausea or Vomiting.             oxyCODONE-acetaminophen (PERCOCET)  MG per tablet  Take 1 tablet by mouth Every 4 (Four) Hours As Needed for Moderate Pain  (max 8 day) for up to 9 days.             sertraline (ZOLOFT) 100 MG tablet  Take 1 tablet by mouth Daily.             sildenafil (REVATIO) 20 MG tablet  Take 5 tablets by mouth Daily As Needed (sexual dysfunction).             thiamine (VITAMIN B1) 100 MG tablet  Take 1 tablet by mouth Daily for 3 doses.             VENTOLIN  (90 Base) MCG/ACT inhaler  Inhale 108 mg Every 4 (Four) Hours As Needed.                 Discharge Diet:   Diet Instructions     Advance Diet As Tolerated             Activity at Discharge:   Activity Instructions     Activity as Tolerated       Discharge Activity Restrictions       No driving until cleared by physician          Follow-up Appointments  Future Appointments  Date Time Provider Department [place]   7/2/2018 10:00 AM Aspen Gaston MD [facility]  [facility] None     Additional Instructions for the Follow-ups that You Need to Schedule     Discharge Follow-up with Specified Provider: Dr. Shanks office; 2 Weeks    As directed      To:  Dr. Shanks office    Follow Up:  2 Weeks         Referral to Home Health    As directed      Face to Face  Visit Date:  6/5/2018    Follow-up Provider for Plan of Care?:  I will be treating the patient on an ongoing basis.  Please send me the Plan of Care for signature.    Follow-up Provider:  TOÑO KRAMER [[id number]]    Reason/Clinical Findings:  s/p TKA    Describe mobility limitations that make leaving home difficult:  taxing effort    Nursing/Therapeutic Services Requested:  Physical Therapy Skilled Nursing    Skilled nursing orders:  Wound care dressing/changes    Instructions:  change dressing daily and prn saturation    PT orders:  Total joint pathway    Frequency:  1 Week 1               Test Results Pending at Discharge  none     Toño Kramer MD  06/05/18,  8:00 AM

## 2018-06-05 NOTE — TELEPHONE ENCOUNTER
This at length, I do not think outpatient weaning is the best option at this point.  He has also already been admitted for his knee replacement.

## 2018-06-05 NOTE — DISCHARGE PLACEMENT REQUEST
"Feliciano Moran  (62 y.o. Male)     Date of Birth Social Security Number Address Home Phone MRN    1955  [address] [id number] [phone] [id number]    Jew Marital Status          Baptism        Admission Date Admission Type Admitting Provider Attending Provider Department, Room/Bed    6/4/18 Elective Toño Shanks MD Rhoads, David, MD [address], [address]    Discharge Date Discharge Disposition Discharge Destination         Home-[facility]              Attending Provider:  Toño Shanks MD    Allergies:  Voltaren [Diclofenac Sodium], Prednisone    Isolation:  None   Infection:  None   Code Status:  FULL    Ht:  175.3 cm (69\")   Wt:  85.7 kg (189 lb)    Admission Cmt:  None   Principal Problem:  Failed total knee, left, initial encounter [T84.093A]                 Active Insurance as of 6/4/2018     Primary Coverage     Payor Plan Insurance Group Employer/Plan Group    MEDICARE MEDICARE A & B      Payor Plan Address Payor Plan Phone Number Effective From Effective To    [address] [phone] 12/1/2008     [facility] [id number]       Subscriber Name Subscriber Birth Date Member ID       FELICIANO MORAN 1955 [id number]                 Emergency Contacts      (Rel.) Home Phone Work Phone Mobile Phone    Yue Cerda (Friend) [phone] -- --              "

## 2018-06-05 NOTE — THERAPY DISCHARGE NOTE
Acute Care - Physical Therapy Initial Eval/Discharge  [facility]     Patient Name: Feliciano Llamas  : 1955  MRN: [id number]  Today's Date: 2018   Onset of Illness/Injury or Date of Surgery: 18     Referring Physician: Rimma      Admit Date: 2018    Visit Dx:    ICD-10-CM ICD-9-CM   1. Failed total knee, left, initial encounter T84.093A 996.47     V43.65   2. Decreased mobility R26.89 781.99     Patient Active Problem List   Diagnosis   • Alcoholic   • Essential hypertension   • Recurrent major depressive disorder, in partial remission   • Failed total knee, left, initial encounter   • Alcohol withdrawal   • Asthma     Past Medical History:   Diagnosis Date   • Alcohol consumption heavy    • Asthma    • At risk for sleep apnea    • COPD (chronic obstructive pulmonary disease)    • Depression    • ED (erectile dysfunction)    • Hard of hearing    • History of staph infection     LEFT KNEE,    • Hypertension    • Irregular heart beat      Past Surgical History:   Procedure Laterality Date   • COLON RESECTION     • HERNIA REPAIR     • SHOULDER SURGERY Right    • TOTAL HIP ARTHROPLASTY Right    • TOTAL SHOULDER ARTHROPLASTY Right           PT ASSESSMENT (last 12 hours)      Physical Therapy Evaluation     Row Name 18 0850          PT Evaluation Time/Intention    Subjective Information complains of;pain  -EE     Document Type evaluation;discharge evaluation/summary  -EE     Patient Effort good  -EE     Symptoms Noted During/After Treatment none  -EE     Row Name 18 0850          General Information    Onset of Illness/Injury or Date of Surgery 18  -EE     Referring Physician Rimma  -EE     Patient Observations alert;cooperative;agree to therapy  -EE     Patient/Family Observations Pt supine in bed in no acute distress; mild tremors noted.  -EE     Prior Level of Function independent:;all household mobility;community mobility  -EE     Equipment Currently Used at Home  walker, rolling   not using prior to admission  -EE     Pertinent History of Current Functional Problem s/p L TKA revision  -EE     Existing Precautions/Restrictions fall  -EE     Barriers to Rehab none identified  -EE     Row Name 06/05/18 0850          Relationship/Environment    Lives With significant other  -EE     Row Name 06/05/18 0850          Resource/Environmental Concerns    Current Living Arrangements home/apartment/condo  -EE     Row Name 06/05/18 0850          Home Main Entrance    Number of Stairs, Main Entrance other (see comments)   15  -EE     Stair Railings, Main Entrance railings on both sides of stairs  -EE     Row Name 06/05/18 0850          Cognitive Assessment/Interventions    Additional Documentation Cognitive Assessment/Intervention (Group)  -EE     Row Name 06/05/18 0850          Cognitive Assessment/Intervention- PT/OT    Orientation Status (Cognition) oriented x 4  -EE     Follows Commands (Cognition) WNL  -EE     Safety Deficit (Cognitive) mild deficit;awareness of need for assistance  -EE     Personal Safety Interventions fall prevention program maintained;gait belt;muscle strengthening facilitated;nonskid shoes/slippers when out of bed;supervised activity  -EE     Row Name 06/05/18 0850          Safety Issues, Functional Mobility    Impairments Affecting Function (Mobility) pain;range of motion (ROM);strength  -EE     Row Name 06/05/18 0850          Bed Mobility Assessment/Treatment    Bed Mobility Assessment/Treatment supine-sit  -EE     Supine-Sit Beason (Bed Mobility) conditional independence  -EE     Assistive Device (Bed Mobility) bed rails  -EE     Row Name 06/05/18 0850          Transfer Assessment/Treatment    Transfer Assessment/Treatment sit-stand transfer;stand-sit transfer  -EE     Sit-Stand Beason (Transfers) contact guard;verbal cues  -EE     Stand-Sit Beason (Transfers) contact guard;stand by assist;verbal cues  -EE     Row Name 06/05/18 0850           Sit-Stand Transfer    Assistive Device (Sit-Stand Transfers) walker, front-wheeled  -EE     Row Name 06/05/18 0850          Stand-Sit Transfer    Assistive Device (Stand-Sit Transfers) walker, front-wheeled  -EE     Row Name 06/05/18 0850          Gait/Stairs Assessment/Training    Orange Level (Gait) contact guard;stand by assist;verbal cues  -EE     Assistive Device (Gait) walker, front-wheeled  -EE     Distance in Feet (Gait) 130  -EE     Deviations/Abnormal Patterns (Gait) antalgic;alexander decreased  -EE     Left Sided Gait Deviations weight shift ability decreased;heel strike decreased  -EE     Orange Level (Stairs) contact guard;verbal cues  -EE     Handrail Location (Stairs) both sides  -EE     Number of Steps (Stairs) 4  -EE     Ascending Technique (Stairs) step-to-step  -EE     Descending Technique (Stairs) step-to-step  -EE     Comment (Gait/Stairs) Verbal cues for sequencing with walker; able to progress to reciprocal gait pattern with cues. Cues for sequencing on stairs. Slightly tremulous/unsteady during ambulation; however, no LOB observed.  -EE     Row Name 06/05/18 0850          General ROM    GENERAL ROM COMMENTS L knee impaired grossly 30%; all others WFL  -EE     Row Name 06/05/18 0850          MMT (Manual Muscle Testing)    Additional Documentation General Assessment (Manual Muscle Testing) (Group)  -     Row Name 06/05/18 0850          General Assessment (Manual Muscle Testing)    General Manual Muscle Testing (MMT) Assessment lower extremity strength deficits identified  -EE     Comment, General Manual Muscle Testing (MMT) Assessment L LE limited by pain; R LE WFL  -EE     Row Name 06/05/18 0850          Motor Assessment/Intervention    Additional Documentation Therapeutic Exercise Interventions (Group)  -EE     Row Name 06/05/18 0850          Therapeutic Exercise    Comment (Therapeutic Exercise) 10 reps L TKA protocol; assist w/SLR  -EE     Row Name 06/05/18 0850          Pain  Assessment    Additional Documentation Pain Scale: Numbers Pre/Post-Treatment (Group)  -EE     Row Name 06/05/18 0850          Pain Scale: Numbers Pre/Post-Treatment    Pain Scale: Numbers, Pretreatment 6/10  -EE     Pain Location - Side Left  -EE     Pain Location knee  -EE     Pain Intervention(s) Repositioned;Cold applied;Medication (See MAR);Ambulation/increased activity  -EE     Row Name             Wound 06/04/18 1803 Left knee incision    Wound - Properties Group Date first assessed: 06/04/18  -WC Time first assessed: 1803  -WC Side: Left  -WC Location: knee  -WC Type: incision  -WC    Row Name 06/05/18 0850          Plan of Care Review    Plan of Care Reviewed With patient  -EE     Row Name 06/05/18 0850          Physical Therapy Clinical Impression    Patient/Family Goals Statement (PT Clinical Impression) Decrease pain; go home  -EE     Criteria for Skilled Interventions Met (PT Clinical Impression) other (see comments)   DC home today; rec continued HH PT  -EE     Pathology/Pathophysiology Noted (Describe Specifically for Each System) musculoskeletal  -EE     Impairments Found (describe specific impairments) gait, locomotion, and balance;joint integrity and mobility;ROM  -EE     Rehab Potential (PT Clinical Summary) good, to achieve stated therapy goals  -EE     Row Name 06/05/18 0850          Positioning and Restraints    Pre-Treatment Position in bed  -EE     Post Treatment Position chair  -EE     In Chair reclined;call light within reach;encouraged to call for assist;legs elevated;notified nsg  -EE     Row Name 06/05/18 0850          Physical Therapy Discharge Summary    Additional Documentation Discharge Summary, PT Eval (Group)  -EE     Row Name 06/05/18 0850          Discharge Summary, PT Eval    Reason for Discharge (PT Discharge Summary) patient discharged from this facility  -EE     Outcomes Achieved Upon Discharge (PT Discharge Summary) evaluation only  -EE     Transfer to Another Level of Care  or Facility (PT Discharge Summary) patient to receive therapy via home health  -EE     Row Name 06/05/18 0850          Living Environment    Home Accessibility stairs to enter home  -EE       User Key  (r) = Recorded By, (t) = Taken By, (c) = Cosigned By    Initials Name Provider Type    KYAW Hassan PT Physical Therapist    SURYA Soriano RN Registered Nurse          Physical Therapy Education     Title: PT OT SLP Therapies (Resolved)     Topic: Physical Therapy (Resolved)     Point: Mobility training (Resolved)    Learning Progress Summary     Learner Status Readiness Method Response Comment Documented by    Patient Done Acceptance E,TB DU,VU  EE 06/05/18 0938          Point: Home exercise program (Resolved)    Learning Progress Summary     Learner Status Readiness Method Response Comment Documented by    Patient Done Acceptance E,TB DU,VU  EE 06/05/18 0938          Point: Body mechanics (Resolved)    Learning Progress Summary     Learner Status Readiness Method Response Comment Documented by    Patient Done Acceptance E,TB DU,VU  EE 06/05/18 0938          Point: Precautions (Resolved)    Learning Progress Summary     Learner Status Readiness Method Response Comment Documented by    Patient Done Acceptance E,TB DU,VU  EE 06/05/18 0938                      User Key     Initials Effective Dates Name Provider Type [facility] 04/03/18 -  Valerie Hassan PT Physical Therapist PT                PT Recommendation and Plan  Anticipated Discharge Disposition (PT): home with assist, home with home health  Therapy Frequency (PT Clinical Impression): evaluation only  Outcome Summary/Treatment Plan (PT)  Anticipated Discharge Disposition (PT): home with assist, home with home health  Reason for Discharge (PT Discharge Summary): patient discharged from this facility  Plan of Care Reviewed With: patient  Outcome Summary: Pt presents s/p L TKA revision, also with suspected alcohol withdrawal. Upon exam, pt demonstrates  post op pain, impaired L knee ROM, L LE weakness, and impaired gait mechanics limiting mobility. Pt was independent without AD prior to admission; currently requiring assist of one and use of walker for mobility. Pt instructed in L TKA exercises; demonstrates good tolerance of HEP. Ambulated in hallway with walker and negotiated stairs with use of handrails and cues for sequencing. Pt plans to DC home today; recommend continued  PT for further stretching, strenghening, and mobility training. Pt has walker at home; significant other plans to assist as needed. No further acute PT concerns at this time.           Outcome Measures     Row Name 06/05/18 0900             How much help from another person do you currently need...    Turning from your back to your side while in flat bed without using bedrails? 4  -EE      Moving from lying on back to sitting on the side of a flat bed without bedrails? 4  -EE      Moving to and from a bed to a chair (including a wheelchair)? 3  -EE      Standing up from a chair using your arms (e.g., wheelchair, bedside chair)? 3  -EE      Climbing 3-5 steps with a railing? 3  -EE      To walk in hospital room? 3  -EE      AM-PAC 6 Clicks Score 20  -EE         Functional Assessment    Outcome Measure Options AM-PAC 6 Clicks Basic Mobility (PT)  -EE        User Key  (r) = Recorded By, (t) = Taken By, (c) = Cosigned By    Initials Name Provider Type    KYAW Hassan PT Physical Therapist           Time Calculation:         PT Charges     Row Name 06/05/18 0942             Time Calculation    Start Time 0850  -EE      Stop Time 0910  -EE      Time Calculation (min) 20 min  -EE      PT Received On 06/05/18  -EE        User Key  (r) = Recorded By, (t) = Taken By, (c) = Cosigned By    Initials Name Provider Type    KYAW Hassan PT Physical Therapist          Therapy Charges for Today     Code Description Service Date Service Provider Modifiers Qty    37745920821  PT MOBILITY CURRENT  6/5/2018 Valerie Hassan, PT GP, CJ 1    05662173129 HC PT MOBILITY PROJECTED 6/5/2018 Valerie Hassan, PT GP, CJ 1    64683476334 HC PT MOBILITY DISCHARGE 6/5/2018 Valerie Hassan, PT GP, CJ 1    08192052482 HC PT EVAL LOW COMPLEXITY 1 6/5/2018 Valerie Hassan, PT GP 1    [id number] HC PT THER PROC EA 15 MIN 6/5/2018 Valerie Hassan, PT GP 1          PT G-Codes  Outcome Measure Options: AM-PAC 6 Clicks Basic Mobility (PT)  Functional Limitation: Mobility: Walking and moving around  Mobility: Walking and Moving Around Current Status (): At least 20 percent but less than 40 percent impaired, limited or restricted  Mobility: Walking and Moving Around Goal Status (): At least 20 percent but less than 40 percent impaired, limited or restricted  Mobility: Walking and Moving Around Discharge Status (): At least 20 percent but less than 40 percent impaired, limited or restricted    PT Discharge Summary  Anticipated Discharge Disposition (PT): home with assist, home with home health    Valerie Hassan, PT  6/5/2018

## 2018-06-05 NOTE — PERIOPERATIVE NURSING NOTE
Dr. Altamirano returned phone call, advised of history drinking ETOH 1/2 pint everyday, last was Saturday.  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

## 2018-06-05 NOTE — PROGRESS NOTES
Procedure(s):  LT TOTAL KNEE ARTHROPLASTY REVISION     LOS: 1 day     Subjective :   Complains of pain.    Objective :    Vital signs in last 24 hours:  Vitals:    06/04/18 2025 06/04/18 2040 06/04/18 2057 06/05/18 0010   BP: 147/77 141/79 135/76 160/85   BP Location:   Left arm Left arm   Patient Position:   Lying Lying   Pulse: 78 75 78 76   Resp: 14 14 14 16   Temp:  97.8 °F (36.6 °C) 97.4 °F (36.3 °C) 97.6 °F (36.4 °C)   TempSrc:  Oral Oral Oral   SpO2: 96% 96% 97% 95%   Weight:   85.7 kg (189 lb)    Height:           PHYSICAL EXAM:  Patient is calm, in no acute distress, awake and oriented x 3.  Mild shaking evident.  Dressing has bloody drainage.  No signs of infection.  Swelling is appropriate in amount.  Ecchymosis is appropriate in amount.  Homans test is negative.  Patient is neurovascularly intact distally.    LABS:    Results from last 7 days  Lab Units 06/05/18  0334 06/01/18  1021   WBC 10*3/mm3  --  5.07   HEMOGLOBIN g/dL 10.5* 12.4*   HEMATOCRIT % 31.3* 35.7*   PLATELETS 10*3/mm3  --  148       Results from last 7 days  Lab Units 06/05/18  0334   SODIUM mmol/L 138   POTASSIUM mmol/L 4.0   CHLORIDE mmol/L 101   CO2 mmol/L 21.2*   BUN mg/dL 28*   CREATININE mg/dL 0.96   GLUCOSE mg/dL 126*   CALCIUM mg/dL 9.6           ASSESSMENT:  Status post Procedure(s):  LT TOTAL KNEE ARTHROPLASTY REVISION     ETOH withdrawal.    Plan:  Change dressing.  Continue Physical Therapy, increase mobility and range of motion as tolerated.  Continue SCDs, Continue Lovenox for DVT prophylaxis while inpatient.  Aspirin for home.  Dispo planning for home today if ok with [facility].    Toño Shanks MD    Date: 6/5/2018  Time: 7:12 AM

## 2018-06-05 NOTE — TELEPHONE ENCOUNTER
Bishop wilson, pt is leaving hospital today after knee replacement surgery yesterday. She states pt has consented to stop drinking as long as he has the withdrawal medication, she states it will be hard for him to get around after this surgery so that will minimize the urge to drink. She says he has not drank since Saturday. I explained to her that your note states you did not give the rx to him because he could not promise he would stop drinking. I also explained that you believe it would be best for pt to get sober through inpatient. She voiced understanding but she says pt is willing to stop drinking for this medication and wanted me to get a message to you to ask for the rx again. She states pt is willing to speak with you on the phone. Please advise. Thank you.

## 2018-06-05 NOTE — PLAN OF CARE
Problem: Patient Care Overview  Goal: Plan of Care Review   06/05/18 0939   Coping/Psychosocial   Plan of Care Reviewed With patient   OTHER   Outcome Summary Pt presents s/p L TKA revision, also with suspected alcohol withdrawal. Upon exam, pt demonstrates post op pain, impaired L knee ROM, L LE weakness, and impaired gait mechanics limiting mobility. Pt was independent without AD prior to admission; currently requiring assist of one and use of walker for mobility. Pt instructed in L TKA exercises; demonstrates good tolerance of HEP. Ambulated in hallway with walker and negotiated stairs with use of handrails and cues for sequencing. Pt plans to DC home today; recommend continued HH PT for further stretching, strenghening, and mobility training. Pt has walker at home; significant other plans to assist as needed. No further acute PT concerns at this time.

## 2018-06-05 NOTE — PROGRESS NOTES
Name: Feliciano Llamas ADMIT: 2018   : 1955  PCP: Aspen Gaston MD    MRN: [id number] LOS: 1 days   AGE/SEX: 62 y.o. male  ROOM: [facility]/   Subjective   Says he drank about 3 pints weekly and not daily. He was thinking about cutting back and would like to discuss outpatient treatment options. No CP SOA NVD reported.    Objective   Vital Signs  Temp:  [97.4 °F (36.3 °C)-99 °F (37.2 °C)] 97.4 °F (36.3 °C)  Heart Rate:  [] 76  Resp:  [14-20] 16  BP: (135-197)/() 141/77  SpO2:  [94 %-100 %] 95 %  on  Flow (L/min):  [2-4] 2;   Device (Oxygen Therapy): room air  Body mass index is 27.91 kg/m².    Physical Exam   Constitutional: He appears well-developed. No distress.   HENT:   Head: Normocephalic and atraumatic.   Eyes: Conjunctivae and EOM are normal.   Neck: Normal range of motion. Neck supple.   Cardiovascular: Normal rate, regular rhythm and intact distal pulses.    Pulmonary/Chest: Effort normal. He has wheezes. He has no rales.   Abdominal: Soft. He exhibits no distension. There is no tenderness.   Musculoskeletal: He exhibits no edema.   Knee dressed   Neurological: He is alert. He displays tremor. No cranial nerve deficit.   Skin: Skin is warm and dry. He is not diaphoretic.   Psychiatric: He has a normal mood and affect. His behavior is normal.   Nursing note and vitals reviewed.      Results Review:       I reviewed the patient's new clinical results. Reviewed telemetry, sinus rhythm.    Results from last 7 days  Lab Units 18  0334 18  1021   WBC 10*3/mm3  --  5.07   HEMOGLOBIN g/dL 10.5* 12.4*   PLATELETS 10*3/mm3  --  148     Results from last 7 days  Lab Units 18  0334 18  0958   SODIUM mmol/L 138 139  139   POTASSIUM mmol/L 4.0 4.5  4.5   CHLORIDE mmol/L 101 99  99   CO2 mmol/L 21.2* 21.5*  21.7*   BUN mg/dL 28* 29*  29*   CREATININE mg/dL 0.96 0.97  0.92   GLUCOSE mg/dL 126* 128*  128*   Estimated Creatinine Clearance: 87.7 mL/min (by C-G formula based  on SCr of 0.96 mg/dL).  Results from last 7 days  Lab Units 06/05/18  0334 06/01/18  0958   CALCIUM mg/dL 9.6 11.8*  11.5*   ALBUMIN g/dL  --  4.80         enoxaparin 40 mg Subcutaneous Daily   fluticasone 2 spray Nasal Daily   folic acid 1 mg Oral Daily   lisinopril-hydroCHLOROthiazide (ZESTORETIC) 10-12.5 mg combo dose  Oral Q24H   montelukast 10 mg Oral Nightly   IV Fluids 1000 mL + additives 100 mL/hr Intravenous Daily   multivitamin 1 tablet Oral Daily   sennosides-docusate sodium 2 tablet Oral BID   thiamine 100 mg Oral Daily       sodium chloride 100 mL/hr Last Rate: 100 mL/hr (06/04/18 2230)   Diet Regular      Assessment/Plan      Active Hospital Problems (** Indicates Principal Problem)    Diagnosis Date Noted   • **Failed total knee, left, initial encounter [T84.093A] 06/04/2018   • Alcohol withdrawal [F10.239] 06/04/2018   • Asthma [J45.909] 06/04/2018   • Alcoholic [F10.20] 02/02/2018   • Essential hypertension [I10] 02/02/2018      Resolved Hospital Problems    Diagnosis Date Noted Date Resolved   No resolved problems to display.     - EtOH Wd: Tremulous. Continue ativan prn, mvi, thiamine, folate. Will consult access.  - HTN: BP improved today. Monitor.  - Asthma: Wheezing on exam but no dyspnea. Continue breathing treatments.  - L Knee Replacement: Per primary service.    Will continue to follow. Please call with any questions or concerns.    Giuseppe Hannon MD  [facility]  06/05/18  10:59 AM

## 2018-06-06 ENCOUNTER — OFFICE VISIT (OUTPATIENT)
Dept: FAMILY MEDICINE CLINIC | Facility: CLINIC | Age: 63
End: 2018-06-06

## 2018-06-06 VITALS
TEMPERATURE: 98.5 F | DIASTOLIC BLOOD PRESSURE: 80 MMHG | OXYGEN SATURATION: 98 % | HEIGHT: 69 IN | HEART RATE: 85 BPM | BODY MASS INDEX: 28.44 KG/M2 | RESPIRATION RATE: 14 BRPM | SYSTOLIC BLOOD PRESSURE: 122 MMHG | WEIGHT: 192 LBS

## 2018-06-06 DIAGNOSIS — F10.20 ALCOHOLIC (HCC): Primary | ICD-10-CM

## 2018-06-06 DIAGNOSIS — F10.930 ALCOHOL WITHDRAWAL SYNDROME WITHOUT COMPLICATION (HCC): ICD-10-CM

## 2018-06-06 DIAGNOSIS — T84.018S FAILURE OF TOTAL KNEE REPLACEMENT, SEQUELA: ICD-10-CM

## 2018-06-06 DIAGNOSIS — F55.8 ABUSE OF OTHER NON-PSYCHOACTIVE SUBSTANCES: ICD-10-CM

## 2018-06-06 DIAGNOSIS — Z96.659 FAILURE OF TOTAL KNEE REPLACEMENT, SEQUELA: ICD-10-CM

## 2018-06-06 LAB
POC AMPHETAMINES: NEGATIVE
POC BARBITURATES: NEGATIVE
POC BENZODIAZEPHINES: POSITIVE
POC COCAINE: NEGATIVE
POC METHADONE: NEGATIVE
POC METHAMPHETAMINE SCREEN URINE: NEGATIVE
POC OPIATES: NEGATIVE
POC OXYCODONE: POSITIVE
POC PHENCYCLIDINE: NEGATIVE
POC PROPOXYPHENE: NEGATIVE
POC THC: NEGATIVE
POC TRICYCLIC ANTIDEPRESSANTS: NEGATIVE

## 2018-06-06 PROCEDURE — 80305 DRUG TEST PRSMV DIR OPT OBS: CPT | Performed by: FAMILY MEDICINE

## 2018-06-06 PROCEDURE — 99214 OFFICE O/P EST MOD 30 MIN: CPT | Performed by: FAMILY MEDICINE

## 2018-06-06 RX ORDER — CHLORDIAZEPOXIDE HYDROCHLORIDE 25 MG/1
25 CAPSULE, GELATIN COATED ORAL 3 TIMES DAILY PRN
Qty: 21 CAPSULE | Refills: 0 | Status: SHIPPED | OUTPATIENT
Start: 2018-06-06 | End: 2018-06-06

## 2018-06-06 NOTE — PROGRESS NOTES
Case Management Discharge Note    Final Note: Discharged home.    Destination     No service coordination in this encounter.      Durable Medical Equipment     No service coordination in this encounter.      Dialysis/Infusion     No service coordination in this encounter.      Home Medical Care - Selection Complete     Service Request Status Selected Specialties Address Phone Number Fax Number    [facility] [place] Selected Home Health Services [address] [address] [phone]      Social Care     No service coordination in this encounter.        Other: Other (Private auto)    Final Discharge Disposition Code: 06 - home with home health care

## 2018-06-06 NOTE — PROGRESS NOTES
Feliciano Llamas is a 62 y.o. male.     Chief Complaint   Patient presents with   • Alcohol Problem     Patient needs to discuss alcohol withdrew process.        HPI     Pt is a pleasant 62 y.o. YO male here for Alcohol withdrawl.   He had a artificial knee revision on 6/4/18 with improvement after falling.  Per review of records he was admitted on 6/4 and discharged on 6/5.  He had a left total knee arthroplasty revision.  During the admission he did have signs of the jaw and was placed in a single protocol receiving Ativan.  His last dose was yesterday.  He did not have any DTs, seizure or tachycardia.  He has not had a drink since discharge and is committed to stop drinking.  He has support with his girlfriend, he has access to a sponsor that he has not called yet.  He has withdrawn from alcohol previously and is aware of the process.  He was previous to drinking about a half pint a day.  We have had a discussion regarding alcohol withdrawal.        The following portions of the patient's history were reviewed and updated as appropriate: allergies, current medications, past family history, past medical history, past social history, past surgical history and problem list.    Review of Systems   Constitutional: Negative for activity change and fever.   Musculoskeletal: Positive for joint swelling.   All other systems reviewed and are negative.      Objective  Vitals:    06/06/18 1252   BP: 122/80   Pulse: 85   Resp: 14   Temp: 98.5 °F (36.9 °C)   SpO2: 98%        Physical Exam   Constitutional: He is oriented to person, place, and time. He appears well-developed and well-nourished. No distress.   HENT:   Head: Normocephalic.   Nose: Nose normal.   Eyes: EOM are normal.   Cardiovascular: Normal rate and regular rhythm.    No murmur heard.  Pulmonary/Chest: Effort normal. No respiratory distress.   Musculoskeletal: Normal range of motion.   Bandage of the left knee    Neurological: He is alert and oriented to  person, place, and time.   Skin: Skin is warm and dry. No rash noted.   Psychiatric: He has a normal mood and affect. His behavior is normal. Judgment and thought content normal.   Nursing note and vitals reviewed.        Current Outpatient Prescriptions:   •  aspirin (SHIRA ASPIRIN) 325 MG tablet, Take 1 tablet by mouth Daily for 42 days., Disp: 42 tablet, Rfl: 0  •  fluticasone (FLONASE) 50 MCG/ACT nasal spray, 2 sprays into each nostril Daily for 30 days. Administer 2 sprays in each nostril daily., Disp: 1 bottle, Rfl: 6  •  folic acid (FOLVITE) 1 MG tablet, Take 1 tablet by mouth Daily., Disp: 30 tablet, Rfl: 0  •  ipratropium-albuterol (COMBIVENT RESPIMAT)  MCG/ACT inhaler, Inhale 1 puff 4 (Four) Times a Day As Needed for Wheezing., Disp: 1 g, Rfl: 0  •  lisinopril-hydrochlorothiazide (PRINZIDE,ZESTORETIC) 20-12.5 MG per tablet, Take 1 tablet by mouth Every Morning., Disp: , Rfl: 3  •  montelukast (SINGULAIR) 10 MG tablet, Take 1 tablet by mouth Every Night., Disp: 30 tablet, Rfl: 3  •  oxyCODONE-acetaminophen (PERCOCET)  MG per tablet, Take 1 tablet by mouth Every 4 (Four) Hours As Needed for Moderate Pain  (max 8 day) for up to 9 days., Disp: 56 tablet, Rfl: 0  •  sertraline (ZOLOFT) 100 MG tablet, Take 1 tablet by mouth Daily. (Patient taking differently: Take 100 mg by mouth Every Morning.), Disp: 90 tablet, Rfl: 3  •  sildenafil (REVATIO) 20 MG tablet, Take 5 tablets by mouth Daily As Needed (sexual dysfunction). (Patient taking differently: Take 100 mg by mouth Daily As Needed (sexual dysfunction). TO HOLD BEFORE SURGERY), Disp: 50 tablet, Rfl: 2  •  thiamine (VITAMIN B1) 100 MG tablet, Take 1 tablet by mouth Daily for 3 doses., Disp: 3 tablet, Rfl: 0  •  VENTOLIN  (90 Base) MCG/ACT inhaler, Inhale 108 mg Every 4 (Four) Hours As Needed., Disp: , Rfl: 6  •  ondansetron (ZOFRAN) 4 MG tablet, Take 1 tablet by mouth Every 8 (Eight) Hours As Needed for Nausea or Vomiting., Disp: 30 tablet,  Rfl: 0    Procedures    Lab Results (most recent)     None        Office Visit on 06/06/2018   Component Date Value Ref Range Status   • Methamphetaine Screen, Urine 06/06/2018 Negative  Negative Final   • POC Amphetamines 06/06/2018 Negative  Negative Final   • Barbiturates Screen 06/06/2018 Negative  Negative Final   • Benzodiazepine Screen 06/06/2018 Positive* Negative Final   • Cocaine Screen 06/06/2018 Negative  Negative Final   • Methadone Screen 06/06/2018 Negative  Negative Final   • Opiate Screen 06/06/2018 Negative  Negative Final   • Oxycodone, Screen 06/06/2018 Positive* Negative Final   • Phencyclidine (PCP) Screen 06/06/2018 Negative  Negative Final   • Propoxyphene Screen 06/06/2018 Negative  Negative Final   • THC, Screen 06/06/2018 Negative  Negative Final   • Tricyclic Antidepressants Screen 06/06/2018 Negative  Negative Final    pt had to leave urine twice, smaple did not register within durg test urine cup. pt had dropped someone else urine sample.            Feliciano was seen today for alcohol problem.    Diagnoses and all orders for this visit:    Alcoholic  -     Discontinue: chlordiazePOXIDE (LIBRIUM) 25 MG capsule; Take 1 capsule by mouth 3 (Three) Times a Day As Needed for Anxiety or Withdrawal for up to 7 days.    Alcohol withdrawal syndrome without complication  -     Discontinue: chlordiazePOXIDE (LIBRIUM) 25 MG capsule; Take 1 capsule by mouth 3 (Three) Times a Day As Needed for Anxiety or Withdrawal for up to 7 days.  -     POC Urine Drug Screen, Triage    Abuse of other non-psychoactive substances   -     POC Urine Drug Screen, Triage    Failure of total knee replacement, sequela      We have discussed alcohol withdrawal length.  He is ready to quit drinking. He has no signs of DTs currently, mild tremors but vitals are stable. His last benzo was yesterday in the hospital.  Plan to get a sponsor with a AA, follow-up in one week. Librium was prescribed initially but not given to the  patient as we were waiting for the urine tox.  The first tox was cold and asked for a second, he was very irritated and left a second tox with a small amount of urine.  He stated he was in a hurry and left to go to his PT apt and planned to return to  his librium script.  As a result, I was unable to discuss the 2 cold urine tox screens with him.    I suspect this urine was not his as it was cold.   An alternate plan would be to get serum tox, however that as the patient left this was no longer possible.  The prescription for Librium was voided.  As this is the second attempt to do outpatient alcohol weaning with the patient being unreliable, I do not think it is in his or my best interest to continue with this plan.  I recommend that he be admitted to an inpatient facility to safely be withdrawn from alcohol.     Continue FU with Ortho for knee revision.     Return in about 1 week (around 6/13/2018), or if symptoms worsen or fail to improve.      Aspen Gaston MD

## 2018-06-09 ENCOUNTER — HOSPITAL ENCOUNTER (EMERGENCY)
Facility: HOSPITAL | Age: 63
Discharge: HOME | End: 2018-06-09
Attending: EMERGENCY MEDICINE | Admitting: EMERGENCY MEDICINE

## 2018-06-09 VITALS
OXYGEN SATURATION: 98 % | HEIGHT: 69 IN | TEMPERATURE: 98.8 F | WEIGHT: 190 LBS | RESPIRATION RATE: 16 BRPM | SYSTOLIC BLOOD PRESSURE: 124 MMHG | DIASTOLIC BLOOD PRESSURE: 70 MMHG | HEART RATE: 71 BPM | BODY MASS INDEX: 28.14 KG/M2

## 2018-06-09 DIAGNOSIS — T78.3XXA ANGIOEDEMA, INITIAL ENCOUNTER: Primary | ICD-10-CM

## 2018-06-09 PROCEDURE — 99283 EMERGENCY DEPT VISIT LOW MDM: CPT

## 2018-06-09 PROCEDURE — 96375 TX/PRO/DX INJ NEW DRUG ADDON: CPT

## 2018-06-09 PROCEDURE — 25010000002 METHYLPREDNISOLONE PER 125 MG: Performed by: NURSE PRACTITIONER

## 2018-06-09 PROCEDURE — 96374 THER/PROPH/DIAG INJ IV PUSH: CPT

## 2018-06-09 PROCEDURE — 25010000002 DIPHENHYDRAMINE PER 50 MG: Performed by: NURSE PRACTITIONER

## 2018-06-09 RX ORDER — HYDROXYZINE HYDROCHLORIDE 25 MG/1
25 TABLET, FILM COATED ORAL EVERY 6 HOURS PRN
Qty: 30 TABLET | Refills: 0 | Status: SHIPPED | OUTPATIENT
Start: 2018-06-09

## 2018-06-09 RX ORDER — SODIUM CHLORIDE 0.9 % (FLUSH) 0.9 %
10 SYRINGE (ML) INJECTION AS NEEDED
Status: DISCONTINUED | OUTPATIENT
Start: 2018-06-09 | End: 2018-06-09 | Stop reason: HOSPADM

## 2018-06-09 RX ORDER — DIPHENHYDRAMINE HYDROCHLORIDE 50 MG/ML
25 INJECTION INTRAMUSCULAR; INTRAVENOUS ONCE
Status: COMPLETED | OUTPATIENT
Start: 2018-06-09 | End: 2018-06-09

## 2018-06-09 RX ORDER — FAMOTIDINE 10 MG/ML
20 INJECTION, SOLUTION INTRAVENOUS ONCE
Status: COMPLETED | OUTPATIENT
Start: 2018-06-09 | End: 2018-06-09

## 2018-06-09 RX ORDER — METHYLPREDNISOLONE SODIUM SUCCINATE 125 MG/2ML
125 INJECTION, POWDER, LYOPHILIZED, FOR SOLUTION INTRAMUSCULAR; INTRAVENOUS ONCE
Status: COMPLETED | OUTPATIENT
Start: 2018-06-09 | End: 2018-06-09

## 2018-06-09 RX ORDER — PREDNISONE 20 MG/1
40 TABLET ORAL DAILY
Qty: 10 TABLET | Refills: 0 | Status: SHIPPED | OUTPATIENT
Start: 2018-06-09 | End: 2018-06-14

## 2018-06-09 RX ADMIN — FAMOTIDINE 20 MG: 10 INJECTION INTRAVENOUS at 14:13

## 2018-06-09 RX ADMIN — DIPHENHYDRAMINE HYDROCHLORIDE 25 MG: 50 INJECTION, SOLUTION INTRAMUSCULAR; INTRAVENOUS at 14:12

## 2018-06-09 RX ADMIN — METHYLPREDNISOLONE SODIUM SUCCINATE 125 MG: 125 INJECTION, POWDER, FOR SOLUTION INTRAMUSCULAR; INTRAVENOUS at 14:12

## 2018-06-09 NOTE — DISCHARGE INSTRUCTIONS
Return Precautions    Although you are being discharged from the ED today, I encourage you to return for worsening symptoms.  Things can, and do, change such that treatment at home with medication may not be adequate.      Specifically, return for any of the following:    Chest pain, shortness of breath, pain or nausea and vomiting not controlled by medications provided.    Please make a follow up with your Primary Care Provider for a blood pressure recheck.

## 2018-06-09 NOTE — ED PROVIDER NOTES
EMERGENCY DEPARTMENT ENCOUNTER    Room Number:  04/04  Date seen:  6/9/2018  Time seen: 1:48 PM  PCP: Aspen Gaston MD    HPI:  Chief complaint:Facial swelling  Context:Feliciano Llamas is a 62 y.o. male who presents to the ED with c/o gradually worsening R facial swelling starting at approximately 1155. He also c/o numbness. Pt denies pain or SOA. He denies any fall or trauma. Pt reports he recently had L total knee arthoplasty revision with Dr. Shanks (Ortho) on 6/4/18. Wife reports that the pt has been taking percocet, thymine, and folic acid recently since the surgery and is concerned about a possible medication reaction. Pt reports he takes lisinopril for HTN.     Timing: Constant  Duration: 2 hours  Location:R face  Radiation:none  Quality:swelling and numbness  Intensity/Severity:moderate  Associated Symptoms:numbness  Aggravating Factors:none  Alleviating Factors:none  Previous Episodes: He states he takes lisinopril for HTN  Treatment before arrival:Pt reports he had L knee arthroplasty revision with Dr. Shanks (Ortho) on 6/4/18    ALLERGIES  Voltaren [diclofenac sodium] and Prednisone    PAST MEDICAL HISTORY  Active Ambulatory Problems     Diagnosis Date Noted   • Alcoholic 02/02/2018   • Essential hypertension 02/02/2018   • Recurrent major depressive disorder, in partial remission 03/12/2018   • Failed total knee, left, initial encounter 06/04/2018   • Alcohol withdrawal 06/04/2018   • Asthma 06/04/2018     Resolved Ambulatory Problems     Diagnosis Date Noted   • No Resolved Ambulatory Problems     Past Medical History:   Diagnosis Date   • Alcohol consumption heavy    • Asthma    • At risk for sleep apnea    • COPD (chronic obstructive pulmonary disease)    • Depression    • ED (erectile dysfunction)    • Hard of hearing    • History of staph infection    • Hypertension    • Irregular heart beat        PAST SURGICAL HISTORY  Past Surgical History:   Procedure Laterality Date   • COLON RESECTION     •  "HERNIA REPAIR     • SHOULDER SURGERY Right    • TOTAL HIP ARTHROPLASTY Right    • TOTAL KNEE ARTHROPLASTY REVISION Left 6/4/2018    Procedure: LT TOTAL KNEE ARTHROPLASTY REVISION;  Surgeon: Toño Shanks MD;  Location: [facility] MAIN OR;  Service: Orthopedics   • TOTAL SHOULDER ARTHROPLASTY Right        FAMILY HISTORY  Family History   Problem Relation Age of Onset   • Lung cancer Mother         met to brain   • Hypertension Mother    • Colon cancer Father    • Heart failure Father    • Diabetes Father    • Hypertension Father    • Bipolar disorder Sister    • Suicidality Sister    • Suicidality Brother    • Anxiety disorder Sister         unknown psych   • Anxiety disorder Sister    • Bipolar disorder Sister    • Breast cancer Neg Hx    • Heart attack Neg Hx    • Stroke Neg Hx    • Malig Hyperthermia Neg Hx        SOCIAL HISTORY  Social History     Social History   • Marital status:      Spouse name: N/A   • Number of children: N/A   • Years of education: N/A     Occupational History   • Not on file.     Social History Main Topics   • Smoking status: Never Smoker   • Smokeless tobacco: Never Used   • Alcohol use Yes      Comment: \"HALF PINT A DAY\"- liquor-saturday   • Drug use: No   • Sexual activity: Defer     Other Topics Concern   • Not on file     Social History Narrative   • No narrative on file       REVIEW OF SYSTEMS  Review of Systems   Constitutional: Negative for activity change, appetite change, diaphoresis and fever.   HENT: Positive for facial swelling (R). Negative for sore throat and trouble swallowing.    Eyes: Negative for visual disturbance.   Respiratory: Negative for cough, chest tightness, shortness of breath and wheezing.    Cardiovascular: Negative for chest pain, palpitations and leg swelling.   Gastrointestinal: Negative for abdominal pain, diarrhea, nausea and vomiting.   Genitourinary: Negative for dysuria.   Musculoskeletal: Negative for back pain.   Skin: Negative for rash. "   Neurological: Positive for numbness. Negative for dizziness, speech difficulty and light-headedness.       PHYSICAL EXAM  ED Triage Vitals   Temp Heart Rate Resp BP SpO2   06/09/18 1343 06/09/18 1343 06/09/18 1344 -- 06/09/18 1343   98.8 °F (37.1 °C) 101 18  98 %      Temp src Heart Rate Source Patient Position BP Location FiO2 (%)   06/09/18 1343 06/09/18 1343 -- -- --   Oral Monitor        Physical Exam   Constitutional: He is oriented to person, place, and time and well-developed, well-nourished, and in no distress. No distress.   HENT:   Head: Normocephalic and atraumatic.   Mouth/Throat: No trismus in the jaw. Abnormal dentition. No dental abscesses.   There is upper and lower oral swelling (R>L) as well as R sided facial swelling. There are no areas of fluctuance or tenderness.    Eyes: Pupils are equal, round, and reactive to light.   Neck: Normal range of motion. Neck supple.   Cardiovascular: Normal rate, S1 normal, S2 normal and normal heart sounds.  Exam reveals no gallop and no friction rub.    No murmur heard.  Pulmonary/Chest: Effort normal and breath sounds normal. No respiratory distress. He has no wheezes. He has no rales. He exhibits no tenderness.   Abdominal: Soft. There is no rebound and no guarding.   Musculoskeletal: Normal range of motion. He exhibits no deformity.   Lymphadenopathy:     He has no cervical adenopathy.   Neurological: He is alert and oriented to person, place, and time.   Skin: Skin is warm and dry.   Psychiatric: Affect normal.   Nursing note and vitals reviewed.    MEDICATIONS GIVEN IN ER  Medications   sodium chloride 0.9 % flush 10 mL (not administered)   methylPREDNISolone sodium succinate (SOLU-Medrol) injection 125 mg (125 mg Intravenous Given 6/9/18 1412)   diphenhydrAMINE (BENADRYL) injection 25 mg (25 mg Intravenous Given 6/9/18 1412)   famotidine (PEPCID) injection 20 mg (20 mg Intravenous Given 6/9/18 1413)       EKG  Interpreted by ED  "Physician    PROCEDURES  Procedures    COURSE & MEDICAL DECISION MAKING  Pertinent Labs and Imaging studies that were ordered and reviewed are noted above.  Results were reviewed/discussed with the patient and they were also made aware of online access.  Pt also made aware that some labs, such as cultures, will not be resulted during ER visit and follow up with PMD is necessary.     PROGRESS AND CONSULTS    Progress Notes:       1501 - Rechecked pt. Pt is slightly improved. Pt continues to deny SOA.     1530 -  Reviewed pt's history and workup with Dr. Otto.  After a bedside evaluation, Dr. Dickson agrees with the plan of care.    1557 - The patient's history, physical exam, and lab findings were discussed with the physician, who also performed a face to face history and physical exam.  I discussed all results and noted any abnormalities with patient.  Discussed absoute need to recheck abnormalities with their family physician.  I answered any of the patient's questions.  Discussed plan for discharge, as there is no emergent indication for admission.  Pt is agreeable and understands need for follow up and repeat testing.  Pt is aware that discharge does not mean that nothing is wrong but it indicates no emergency is present and they must continue care with their family physician.  Pt is discharged with instructions to follow up with primary care doctor to have their blood pressure rechecked.     Disposition vitals:  /70 (BP Location: Right arm, Patient Position: Lying)   Pulse 71   Temp 98.8 °F (37.1 °C) (Oral)   Resp 16   Ht 175.3 cm (69\")   Wt 86.2 kg (190 lb)   SpO2 98%   BMI 28.06 kg/m²       DIAGNOSIS  Final diagnoses:   Angioedema, initial encounter       FOLLOW UP   Aspen Gaston MD  [address] [facility] [address] [phone]    Schedule an appointment as soon as possible for a visit in 2 days        RX     Medication List      New Prescriptions    hydrOXYzine 25 MG tablet  Commonly " known as:  ATARAX  Take 1 tablet by mouth Every 6 (Six) Hours As Needed for Itching or   Allergies.     predniSONE 20 MG tablet  Commonly known as:  DELTASONE  Take 2 tablets by mouth Daily for 5 days.        Changed    sertraline 100 MG tablet  Commonly known as:  ZOLOFT  Take 1 tablet by mouth Daily.  What changed:  when to take this     sildenafil 20 MG tablet  Commonly known as:  REVATIO  Take 5 tablets by mouth Daily As Needed (sexual dysfunction).  What changed:  additional instructions        Stop    lisinopril-hydrochlorothiazide 20-12.5 MG per tablet  Commonly known as:  PRINZIDE,ZESTORETIC     thiamine 100 MG tablet  Commonly known as:  VITAMIN B1          Documentation assistance provided by carmen Griffiths for JENNIFER Truong.  Information recorded by the sammiibe was done at my direction and has been verified and validated by me.  Electronically signed by German Griffiths on 6/9/2018 at time 3:58 PM             German Griffiths  06/09/18 1558       JENNIFER Ferris  06/09/18 [id number]

## 2018-06-09 NOTE — ED PROVIDER NOTES
The patient presents complaining of facial swelling that began a few days ago. Pt denies pain, rash, itiching, dysphagia, or .tooth ache. Pt is on Lisinopril.    Physical Exam:  Patient is nontoxic appearing and in NAD. The pt is alert and oriented X 3.  Lungs/cardiovascular: The pt's heart is RRR without murmur. The pt's lungs are clear to auscultation.The patient exhibits some angioedema of the lips. Oropharynx and tongue show no edema and the Patient is able to swallow secretions with no difficulty.   The patients skin shows so no rash.     Plan: Discussed that the symptoms are likely due to the Lisinopril that has a common reaction of sudden facial swelling. Discussed plan to keep an eye on patient and start the pt on pepcids, solumedrol, benadryl, and then reassess. The patient understands and agrees with the plan. All questions have been answered.    MD ATTESTATION NOTE    The ИРИНА and I have discussed this patient's history, physical exam, and treatment plan.  I have reviewed the documentation and personally had a face to face interaction with the patient. I affirm the documentation and agree with the treatment and plan.  The attached note describes my personal findings.    Documentation assistance provided by carmen Mayfield for Dr. Otto. Information recorded by the scribe was done at my direction and has been verified and validated by me.            Lucille Mayfield  06/09/18 1489       Abad Otto MD  06/09/18 2413

## 2018-06-09 NOTE — ED TRIAGE NOTES
"Facial swelling to the right side, lips, mouth. Started this morning, possible folic acid or thymine reaction. Patient denies any difficulty swallowing or throat closing. Patient is able to talk without distress. Patient states, \"my bottom lip is become numb.\"   "

## 2018-06-11 ENCOUNTER — TELEPHONE (OUTPATIENT)
Dept: FAMILY MEDICINE CLINIC | Facility: CLINIC | Age: 63
End: 2018-06-11

## 2018-06-11 RX ORDER — CHLORTHALIDONE 50 MG/1
50 TABLET ORAL DAILY
Qty: 30 TABLET | Refills: 2 | Status: SHIPPED | OUTPATIENT
Start: 2018-06-11

## 2018-06-11 NOTE — TELEPHONE ENCOUNTER
He needs to be seen - can you please add lisinopril to his allergies. I have sent some Chlorthalidone to the pharmacy.  Take one tablet daily.  Thanks!

## 2018-06-11 NOTE — TELEPHONE ENCOUNTER
Pt called did an allergic reaction to lisinopril last Saturday went to er with swelling in face ,he is requesting any other medication for blood pressure,please advise

## 2018-06-12 ENCOUNTER — TELEPHONE (OUTPATIENT)
Dept: SOCIAL WORK | Facility: HOSPITAL | Age: 63
End: 2018-06-12

## 2018-06-13 ENCOUNTER — OFFICE VISIT (OUTPATIENT)
Dept: FAMILY MEDICINE CLINIC | Facility: CLINIC | Age: 63
End: 2018-06-13

## 2018-06-13 VITALS
HEIGHT: 69 IN | SYSTOLIC BLOOD PRESSURE: 142 MMHG | DIASTOLIC BLOOD PRESSURE: 78 MMHG | WEIGHT: 197 LBS | TEMPERATURE: 98.3 F | HEART RATE: 78 BPM | OXYGEN SATURATION: 97 % | RESPIRATION RATE: 14 BRPM | BODY MASS INDEX: 29.18 KG/M2

## 2018-06-13 DIAGNOSIS — F10.930 ALCOHOL WITHDRAWAL SYNDROME WITHOUT COMPLICATION (HCC): ICD-10-CM

## 2018-06-13 DIAGNOSIS — T78.3XXD ANGIOEDEMA, SUBSEQUENT ENCOUNTER: ICD-10-CM

## 2018-06-13 DIAGNOSIS — I10 ESSENTIAL HYPERTENSION: Primary | ICD-10-CM

## 2018-06-13 PROCEDURE — 99214 OFFICE O/P EST MOD 30 MIN: CPT | Performed by: FAMILY MEDICINE

## 2018-06-13 NOTE — PROGRESS NOTES
Feliciano Llamas is a 62 y.o. male.     Chief Complaint   Patient presents with   • Hypertension     Patient is following up on his blood pressure he had a reaction to Lisinopril.        HPI     Pt is a pleasant 62 y.o. YO male here for HTN after ER visit with a new problem of angioedema.   He had acute onset angioedema With no obvious cause.  At this treated to lisinopril.  He has been on this medication for years.  His blood pressure has been elevated since then.  Chlorthalidone 50 mg was called to the pharmacy that the patient has not picked up the prescription yet.  He denies chest pain, palpitations, or shortness of breath.  Currently.  His angioedema has now resolved.    He should aware that I was not able to prescribe Librium as the urine tox was cold.  He states he is not really ready to wean from alcohol.  I encouraged him to go to an inpatient facility to detox when he is ready.    The following portions of the patient's history were reviewed and updated as appropriate: allergies, current medications, past family history, past medical history, past social history, past surgical history and problem list.    Review of Systems   Constitutional: Negative for activity change.   Respiratory: Negative for wheezing and stridor.    All other systems reviewed and are negative.      Objective  Vitals:    06/13/18 1405   BP: 142/78   Pulse: 78   Resp: 14   Temp: 98.3 °F (36.8 °C)   SpO2: 97%        Physical Exam   Constitutional: He is oriented to person, place, and time. He appears well-developed and well-nourished. No distress.   HENT:   Head: Normocephalic.   Nose: Nose normal.   Eyes: EOM are normal.   Cardiovascular:   No murmur heard.  Pulmonary/Chest: Effort normal. No respiratory distress.   Musculoskeletal: Normal range of motion.   Neurological: He is alert and oriented to person, place, and time.   Skin: Skin is warm and dry. No rash noted.   Psychiatric: He has a normal mood and affect. His behavior is  normal. Judgment and thought content normal.   Nursing note and vitals reviewed.        Current Outpatient Prescriptions:   •  aspirin (SHIRA ASPIRIN) 325 MG tablet, Take 1 tablet by mouth Daily for 42 days., Disp: 42 tablet, Rfl: 0  •  chlorthalidone (HYGROTEN) 50 MG tablet, Take 1 tablet by mouth Daily., Disp: 30 tablet, Rfl: 2  •  fluticasone (FLONASE) 50 MCG/ACT nasal spray, 2 sprays into each nostril Daily for 30 days. Administer 2 sprays in each nostril daily., Disp: 1 bottle, Rfl: 6  •  folic acid (FOLVITE) 1 MG tablet, Take 1 tablet by mouth Daily., Disp: 30 tablet, Rfl: 0  •  hydrOXYzine (ATARAX) 25 MG tablet, Take 1 tablet by mouth Every 6 (Six) Hours As Needed for Itching or Allergies., Disp: 30 tablet, Rfl: 0  •  ipratropium-albuterol (COMBIVENT RESPIMAT)  MCG/ACT inhaler, Inhale 1 puff 4 (Four) Times a Day As Needed for Wheezing., Disp: 1 g, Rfl: 0  •  montelukast (SINGULAIR) 10 MG tablet, Take 1 tablet by mouth Every Night., Disp: 30 tablet, Rfl: 3  •  oxyCODONE-acetaminophen (PERCOCET)  MG per tablet, Take 1 tablet by mouth Every 4 (Four) Hours As Needed for Moderate Pain  (max 8 day) for up to 9 days., Disp: 56 tablet, Rfl: 0  •  predniSONE (DELTASONE) 20 MG tablet, Take 2 tablets by mouth Daily for 5 days., Disp: 10 tablet, Rfl: 0  •  sertraline (ZOLOFT) 100 MG tablet, Take 1 tablet by mouth Daily. (Patient taking differently: Take 100 mg by mouth Every Morning.), Disp: 90 tablet, Rfl: 3  •  sildenafil (REVATIO) 20 MG tablet, Take 5 tablets by mouth Daily As Needed (sexual dysfunction). (Patient taking differently: Take 100 mg by mouth Daily As Needed (sexual dysfunction). TO HOLD BEFORE SURGERY), Disp: 50 tablet, Rfl: 2  •  VENTOLIN  (90 Base) MCG/ACT inhaler, Inhale 108 mg Every 4 (Four) Hours As Needed., Disp: , Rfl: 6  •  ondansetron (ZOFRAN) 4 MG tablet, Take 1 tablet by mouth Every 8 (Eight) Hours As Needed for Nausea or Vomiting., Disp: 30 tablet, Rfl:  0    Procedures    Lab Results (most recent)     None              Feliciano was seen today for hypertension.    Diagnoses and all orders for this visit:    Essential hypertension    Angioedema, subsequent encounter    Alcohol withdrawal syndrome without complication      New problem of angioedema likely secondary to lisinopril.  Ace inhibitors and ARBs added to allergy list, patient counseled not to restart this medication again as he could have anaphylaxis.    Blood pressure not well-controlled today.  I have already called chlorthalidone into the pharmacy on June 11.  The patient has not picked this up yet.  I encouraged him to take the medication in the morning.  If his blood pressure does not come back to normal range within 4 weeks return for further follow-up.  Gaol blood pressure less than 130/80.    Additionally, patient not ready to wean from alcohol, not in DTs currently.  We discussed that I will not do outpatient weaning as he has failed to drug tox last week.  He'll need to be admitted to an inpatient rehabilitation facility when he is ready.    Return in about 3 months (around 9/13/2018), or if symptoms worsen or fail to improve, for Recheck HTN  .      Aspen Gaston MD

## 2018-06-15 DIAGNOSIS — J45.909 ASTHMA, UNSPECIFIED ASTHMA SEVERITY, UNSPECIFIED WHETHER COMPLICATED, UNSPECIFIED WHETHER PERSISTENT: Primary | ICD-10-CM

## 2018-07-03 ENCOUNTER — OFFICE VISIT (OUTPATIENT)
Dept: FAMILY MEDICINE CLINIC | Facility: CLINIC | Age: 63
End: 2018-07-03

## 2018-07-03 VITALS
DIASTOLIC BLOOD PRESSURE: 76 MMHG | BODY MASS INDEX: 29.33 KG/M2 | SYSTOLIC BLOOD PRESSURE: 140 MMHG | HEIGHT: 69 IN | TEMPERATURE: 98.6 F | OXYGEN SATURATION: 98 % | HEART RATE: 76 BPM | WEIGHT: 198 LBS

## 2018-07-03 DIAGNOSIS — F10.20 ALCOHOLIC (HCC): ICD-10-CM

## 2018-07-03 DIAGNOSIS — S93.492A SPRAIN OF ANTERIOR TALOFIBULAR LIGAMENT OF LEFT ANKLE, INITIAL ENCOUNTER: ICD-10-CM

## 2018-07-03 DIAGNOSIS — I10 ESSENTIAL HYPERTENSION: Primary | ICD-10-CM

## 2018-07-03 PROCEDURE — 99214 OFFICE O/P EST MOD 30 MIN: CPT | Performed by: FAMILY MEDICINE

## 2018-07-03 RX ORDER — AMLODIPINE BESYLATE 5 MG/1
5 TABLET ORAL DAILY
Qty: 30 TABLET | Refills: 3 | Status: SHIPPED | OUTPATIENT
Start: 2018-07-03

## 2018-07-03 RX ORDER — HYDROCODONE BITARTRATE AND ACETAMINOPHEN 10; 325 MG/1; MG/1
TABLET ORAL
Refills: 0 | COMMUNITY
Start: 2018-06-27

## 2018-07-03 NOTE — PROGRESS NOTES
Feliciano Llamas is a 63 y.o. male.     Chief Complaint   Patient presents with   • Hypertension     follow up no complains   • Knee Pain      has some pain and swelling in left knee and ankle       HPI     Pt is a pleasant 63 y.o. YO male here for knee pain and L ankle pain and  of HTN.  He was leaving a concert at night and walking down some stairs.  He thought that there was a last year but instead was a ramp.  He tripped and fell on his knee and twisted his left ankle.  He was able to move it with no issue.  He recently had surgery to repair his prosthesis.  He had no opening of the wound.  He is able to bear weight with no issue but does have some swelling and bruising on the lateral ankle.  This is a new problem.    Hypertension not well-controlled.  He was started on chlorthalidone at the last appointment.  He was previously well controlled with lisinopril but developed angioedema and was discontinued from it.  He has no chest pain, palpitations or shortness of breath.      The following portions of the patient's history were reviewed and updated as appropriate: allergies, current medications, past family history, past medical history, past social history, past surgical history and problem list.    Review of Systems   Constitutional: Negative for fever.   Respiratory: Negative for shortness of breath.    Cardiovascular: Positive for leg swelling. Negative for chest pain and palpitations.   Musculoskeletal: Positive for joint swelling.        Left knee pain and swelling        Objective  Vitals:    07/03/18 1031   BP: 140/76   Pulse: 76   Temp: 98.6 °F (37 °C)   SpO2: 98%        Physical Exam   Constitutional: He is oriented to person, place, and time. He appears well-developed and well-nourished. No distress.   HENT:   Head: Normocephalic.   Nose: Nose normal.   Eyes: EOM are normal.   Cardiovascular: Normal rate, regular rhythm, normal heart sounds and intact distal pulses.    No murmur  heard.  Pulmonary/Chest: Effort normal and breath sounds normal. No respiratory distress.   Musculoskeletal: Normal range of motion.   Left knee with normal incision, normal range of motion to the knee and no sign of adverse effects from the prosthesis.  Left ankle with swelling and bruising on the inferior and lateral malleolus.  Tenderness on the anterior part of the malleolus but not posterior.  No bony tenderness.  No pain with Weight bearing.   Neurological: He is alert and oriented to person, place, and time.   Skin: Skin is warm and dry. No rash noted.   Psychiatric: He has a normal mood and affect. His behavior is normal. Judgment and thought content normal.   Nursing note and vitals reviewed.        Current Outpatient Prescriptions:   •  aspirin (SHIRA ASPIRIN) 325 MG tablet, Take 1 tablet by mouth Daily for 42 days., Disp: 42 tablet, Rfl: 0  •  chlorthalidone (HYGROTEN) 50 MG tablet, Take 1 tablet by mouth Daily., Disp: 30 tablet, Rfl: 2  •  fluticasone-salmeterol (ADVAIR DISKUS) 500-50 MCG/DOSE DISKUS, Inhale 1 puff 2 (Two) Times a Day., Disp: 60 each, Rfl: 6  •  folic acid (FOLVITE) 1 MG tablet, Take 1 tablet by mouth Daily., Disp: 30 tablet, Rfl: 0  •  hydrOXYzine (ATARAX) 25 MG tablet, Take 1 tablet by mouth Every 6 (Six) Hours As Needed for Itching or Allergies., Disp: 30 tablet, Rfl: 0  •  ipratropium-albuterol (COMBIVENT RESPIMAT)  MCG/ACT inhaler, Inhale 1 puff 4 (Four) Times a Day As Needed for Wheezing., Disp: 1 g, Rfl: 0  •  montelukast (SINGULAIR) 10 MG tablet, Take 1 tablet by mouth Every Night., Disp: 30 tablet, Rfl: 3  •  ondansetron (ZOFRAN) 4 MG tablet, Take 1 tablet by mouth Every 8 (Eight) Hours As Needed for Nausea or Vomiting., Disp: 30 tablet, Rfl: 0  •  sertraline (ZOLOFT) 100 MG tablet, Take 1 tablet by mouth Daily. (Patient taking differently: Take 100 mg by mouth Every Morning.), Disp: 90 tablet, Rfl: 3  •  sildenafil (REVATIO) 20 MG tablet, Take 5 tablets by mouth Daily As  Needed (sexual dysfunction). (Patient taking differently: Take 100 mg by mouth Daily As Needed (sexual dysfunction). TO HOLD BEFORE SURGERY), Disp: 50 tablet, Rfl: 2  •  VENTOLIN  (90 Base) MCG/ACT inhaler, Inhale 108 mg Every 4 (Four) Hours As Needed., Disp: , Rfl: 6  •  amLODIPine (NORVASC) 5 MG tablet, Take 1 tablet by mouth Daily., Disp: 30 tablet, Rfl: 3  •  HYDROcodone-acetaminophen (NORCO)  MG per tablet, TAKE ONE TO TWO TABLETS BY MOUTH EVERY FOUR TO SIX HOURS IF NEEDED FOR PAIN    THIS MEDICATION CONTAINS ACETAMINOPHEN  10MG 325MG, Disp: , Rfl: 0    Procedures    Lab Results (most recent)     None              Feliciano was seen today for hypertension and knee pain.    Diagnoses and all orders for this visit:    Essential hypertension  -     amLODIPine (NORVASC) 5 MG tablet; Take 1 tablet by mouth Daily.    Sprain of anterior talofibular ligament of left ankle, initial encounter    Alcoholic (CMS/HCC)      She'll not well-controlled.  Start amlodipine in addition to chlorthalidone.    Sprain of ankle, we discussed being careful with ambulation after his surgery.  The patient is aware.  He was drinking that night, we discussed alcohol abuse again, not ready to quitl.  RICE and OTC ankle brace.    Return in about 1 month (around 8/3/2018), or if symptoms worsen or fail to improve, for Recheck HTN .      Aspen Gaston MD

## 2018-07-08 NOTE — PLAN OF CARE
Problem: Patient Care Overview  Goal: Plan of Care Review  Outcome: Ongoing (interventions implemented as appropriate)   06/05/18 0400   Coping/Psychosocial   Plan of Care Reviewed With patient   Plan of Care Review   Progress no change   OTHER   Outcome Summary Pt transferred from PACU w/active DT's. Pt has notible tremors, CIWAS have remained below 12, protocol used. Pt c/o L knee pain, decreased pain scale w/PRN pain meds. VSS. Room air. Held down some fluids and solids, should be able to advance to Reg breakfast in AM. Will continue to monitor closely.        Problem: Alcohol Withdrawal Acute, Risk/Actual (Adult)  Goal: Signs and Symptoms of Listed Potential Problems Will be Absent, Minimized or Managed (Alcohol Withdrawal Acute, Risk/Actual)  Outcome: Ongoing (interventions implemented as appropriate)      Problem: Fall Risk (Adult)  Goal: Absence of Fall  Outcome: Ongoing (interventions implemented as appropriate)         normal...

## 2018-08-07 ENCOUNTER — TRANSCRIBE ORDERS (OUTPATIENT)
Dept: ADMINISTRATIVE | Facility: HOSPITAL | Age: 63
End: 2018-08-07

## 2018-08-07 ENCOUNTER — LAB (OUTPATIENT)
Dept: LAB | Facility: HOSPITAL | Age: 63
End: 2018-08-07
Attending: ORTHOPAEDIC SURGERY

## 2018-08-07 DIAGNOSIS — Z01.818 PRE-OPERATIVE CLEARANCE: ICD-10-CM

## 2018-08-07 DIAGNOSIS — Z01.818 PRE-OPERATIVE CLEARANCE: Primary | ICD-10-CM

## 2018-08-07 LAB
ANION GAP SERPL CALCULATED.3IONS-SCNC: 21 MMOL/L
BUN BLD-MCNC: 15 MG/DL (ref 8–23)
BUN/CREAT SERPL: 19.2 (ref 7–25)
CALCIUM SPEC-SCNC: 9.7 MG/DL (ref 8.6–10.5)
CHLORIDE SERPL-SCNC: 100 MMOL/L (ref 98–107)
CO2 SERPL-SCNC: 23 MMOL/L (ref 22–29)
CREAT BLD-MCNC: 0.78 MG/DL (ref 0.76–1.27)
DEPRECATED RDW RBC AUTO: 49 FL (ref 37–54)
ERYTHROCYTE [DISTWIDTH] IN BLOOD BY AUTOMATED COUNT: 13.5 % (ref 11.5–14.5)
GFR SERPL CREATININE-BSD FRML MDRD: 101 ML/MIN/1.73
GLUCOSE BLD-MCNC: 142 MG/DL (ref 65–99)
HCT VFR BLD AUTO: 37 % (ref 40.4–52.2)
HGB BLD-MCNC: 13.4 G/DL (ref 13.7–17.6)
MCH RBC QN AUTO: 35.8 PG (ref 27–32.7)
MCHC RBC AUTO-ENTMCNC: 36.2 G/DL (ref 32.6–36.4)
MCV RBC AUTO: 98.9 FL (ref 79.8–96.2)
PLATELET # BLD AUTO: 92 10*3/MM3 (ref 140–500)
PMV BLD AUTO: 8.8 FL (ref 6–12)
POTASSIUM BLD-SCNC: 3.2 MMOL/L (ref 3.5–5.2)
RBC # BLD AUTO: 3.74 10*6/MM3 (ref 4.6–6)
SODIUM BLD-SCNC: 144 MMOL/L (ref 136–145)
WBC NRBC COR # BLD: 5.39 10*3/MM3 (ref 4.5–10.7)

## 2018-08-07 PROCEDURE — 80048 BASIC METABOLIC PNL TOTAL CA: CPT

## 2018-08-07 PROCEDURE — 36415 COLL VENOUS BLD VENIPUNCTURE: CPT

## 2018-08-07 PROCEDURE — 85027 COMPLETE CBC AUTOMATED: CPT

## 2019-09-06 NOTE — CONSULTS
Name: Feliciano Llamas ADMIT: 2018   : 1955  PCP: sApen Gaston MD    MRN: [id number] LOS: 0 days   AGE/SEX: 62 y.o. male  ROOM: [facility]/     No chief complaint on file.       History of Present Illness  63 yo male who is seen postop after undergoing left knee revision. He has a h/o alcohol abuse & has been drinking 1/2 pint of alcohol/day for the last 8 months or so. His last drink was 2 days ago. Consult was requested while the patient was in the recovery room because he was markedly hypertensive and it was felt that he was going into alcohol withdrawal.  The patient reports that he was in severe pain at that time.  He denies feeling anxious or tremulous when seen by me.  His nurse had given him 1 dose of Ativan earlier.  He denies any history of alcohol withdrawal, DTs or alcohol withdrawal seizures.  He said that he does not feel shaky when he goes without alcohol but a family member in the room said that he does have some tremors.  He reports that he is compliant with all of his antihypertensive medication and that his blood pressure is generally well controlled.      Past Medical History:   Diagnosis Date   • Alcohol consumption heavy    • Asthma    • At risk for sleep apnea    • COPD (chronic obstructive pulmonary disease)    • Depression    • ED (erectile dysfunction)    • Hard of hearing    • History of staph infection     LEFT KNEE,    • Hypertension    • Irregular heart beat      Past Surgical History:   Procedure Laterality Date   • COLON RESECTION     • HERNIA REPAIR     • SHOULDER SURGERY Right    • TOTAL HIP ARTHROPLASTY Right    • TOTAL SHOULDER ARTHROPLASTY Right        Allergies:  Voltaren [diclofenac sodium] and Prednisone    Prescriptions Prior to Admission   Medication Sig Dispense Refill Last Dose   • fluticasone (FLONASE) 50 MCG/ACT nasal spray 2 sprays into each nostril Daily for 30 days. Administer 2 sprays in each nostril daily. 1 bottle 6 2018 at 0800   •  "lisinopril-hydrochlorothiazide (PRINZIDE,ZESTORETIC) 20-12.5 MG per tablet Take 1 tablet by mouth Every Morning.  3 6/4/2018 at 0800   • sertraline (ZOLOFT) 100 MG tablet Take 1 tablet by mouth Daily. (Patient taking differently: Take 100 mg by mouth Every Morning.) 90 tablet 3 6/4/2018 at 0800   • ipratropium-albuterol (COMBIVENT RESPIMAT)  MCG/ACT inhaler Inhale 1 puff 4 (Four) Times a Day As Needed for Wheezing. 1 g 0 Unknown at Unknown time   • montelukast (SINGULAIR) 10 MG tablet Take 1 tablet by mouth Every Night. 30 tablet 3 6/2/2018   • ondansetron (ZOFRAN) 4 MG tablet Take 1 tablet by mouth Every 8 (Eight) Hours As Needed for Nausea or Vomiting. 30 tablet 0 Unknown at Unknown time   • sildenafil (REVATIO) 20 MG tablet Take 5 tablets by mouth Daily As Needed (sexual dysfunction). (Patient taking differently: Take 100 mg by mouth Daily As Needed (sexual dysfunction). TO HOLD BEFORE SURGERY) 50 tablet 2 Unknown at Unknown time   • VENTOLIN  (90 Base) MCG/ACT inhaler Inhale 108 mg Every 4 (Four) Hours As Needed.  6 Unknown at Unknown time       Social History   Substance Use Topics   • Smoking status: Never Smoker   • Smokeless tobacco: Never Used   • Alcohol use Yes      Comment: \"HALF PINT A DAY\"- liquor-saturday       Family History   Problem Relation Age of Onset   • Lung cancer Mother         met to brain   • Hypertension Mother    • Colon cancer Father    • Heart failure Father    • Diabetes Father    • Hypertension Father    • Bipolar disorder Sister    • Suicidality Sister    • Suicidality Brother    • Anxiety disorder Sister         unknown psych   • Anxiety disorder Sister    • Bipolar disorder Sister    • Breast cancer Neg Hx    • Heart attack Neg Hx    • Stroke Neg Hx    • Malig Hyperthermia Neg Hx        Review of Systems   Constitutional: weight stable. Appetite good. No recent fevers or chills.  HEENT: No vision changes. No rhinorrhea, sore throat.  hard of hearing.   Respiratory: " negative for shortness of breath, PND. He does have VIERA. He has a history of asthma but mostly when he is around pets.  No recent cough or chest tightness.   Cardiovascular: No chest pain or palpitations.  No problems with edema  Gastrointestinal: No problems with frequent heartburn. Bowel movements normal. No blood noted in stools. No melena  Endocrine:   diabetes   Genitourinary: No difficulty urinating, dysuria or frequency.   Musculoskeletal: mult joint problems  Skin: No rash  Neurological: Negative for syncope or headaches. No paresthesias or focal weakness  Hematological:  Does not bruise/bleed easily. No h/o DVTs  Psychiatric/Behavioral: No confusion. The patient is not nervous/anxious.       Objective    Vital Signs  Temp:  [97.4 °F (36.3 °C)-99 °F (37.2 °C)] 97.4 °F (36.3 °C)  Heart Rate:  [] 78  Resp:  [14-20] 14  BP: (135-197)/() 135/76  SpO2:  [94 %-100 %] 97 %  on  Flow (L/min):  [2-4] 2;   Device (Oxygen Therapy): nasal cannula  Body mass index is 27.91 kg/m².    Physical Exam   WDWN male in NAD.  He is not tremulous or anxious appearing  PERRL, EOMI, sclera nonicteric. Oropharynx benign, tongue midline, palate elevates symmetrically. Neck supple without adenopathy or thyromegaly. No JVD.  Lungs clear with equal breath sounds bilaterally. No wheezes, rales or rhonchi. Breathing nonlabored  Heart RRR without murmur, gallop or rub.   Abdomen soft, nontender, bowel sounds present throughout.  Extremities no cyanosis, clubbing or edema.   Skin warm & dry  Neuro no gross motor deficits, alert & oriented. Speech fluent.       Results Review:   I reviewed the patient's new clinical results.    Lab Results (last 24 hours)     ** No results found for the last 24 hours. **            Results from last 7 days  Lab Units 06/01/18  1021   WBC 10*3/mm3 5.07   HEMOGLOBIN g/dL 12.4*   PLATELETS 10*3/mm3 148       Results from last 7 days  Lab Units 06/01/18  0958   SODIUM mmol/L 139  139   POTASSIUM  mmol/L 4.5  4.5   CHLORIDE mmol/L 99  99   CO2 mmol/L 21.5*  21.7*   BUN mg/dL 29*  29*   CREATININE mg/dL 0.97  0.92   GLUCOSE mg/dL 128*  128*   ALBUMIN g/dL 4.80   BILIRUBIN mg/dL 0.7   ALK PHOS U/L 59   AST (SGOT) U/L 81*   ALT (SGPT) U/L 51*   Estimated Creatinine Clearance: 91.5 mL/min (by C-G formula based on SCr of 0.92 mg/dL).        Invalid input(s): LDLCALC    Results from last 7 days  Lab Units 06/01/18  0959   NITRITE UA  Negative   WBC UA /HPF 3-5*   BACTERIA UA /HPF Trace*   SQUAM EPITHEL UA /HPF 0-2       XR Knee 1 or 2 View Left   Final Result       Postsurgical changes.               This report was finalized on 6/4/2018 6:15 PM by Dr. Mando Crowley M.D.            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       Plan:     I think I agree with the patient that he is not in alcohol withdrawal and his blood pressure was elevated earlier because he was in pain.  I have ordered standard alcohol withdrawal protocol and will leave this in place.  I have stopped his Zoloft because of an interaction with Toradol but he can go back on this when he goes home.  I think he needs the Toradol for pain control more than he needs the Zoloft for now.    Thank you for this consultation and we will follow along with you.  Patient was seen on 6/4/2008 prior to midnight          Osiris Altamirano MD  [facility]  06/04/18  11:13 PM     Hide Additional Notes?: No Detail Level: Generalized
